# Patient Record
Sex: MALE | Race: OTHER | Employment: UNEMPLOYED | URBAN - METROPOLITAN AREA
[De-identification: names, ages, dates, MRNs, and addresses within clinical notes are randomized per-mention and may not be internally consistent; named-entity substitution may affect disease eponyms.]

---

## 2024-10-29 ENCOUNTER — TELEPHONE (OUTPATIENT)
Dept: OBGYN CLINIC | Facility: MEDICAL CENTER | Age: 34
End: 2024-10-29

## 2024-10-29 NOTE — TELEPHONE ENCOUNTER
Patients brother called to schedule surgery. They did contact price checkers and were given an estimate and was told they were able to go ahead and get the surgery done. Please advise if we can just add them on to surgery schedule or should they follow up?

## 2024-10-31 NOTE — PRE-PROCEDURE INSTRUCTIONS
Pre-Surgery Instructions:   Medication Instructions    acetaminophen (TYLENOL) 500 mg tablet Uses PRN- OK to take day of surgery    ibuprofen (MOTRIN) 600 mg tablet Stop taking this medication at least 3 days prior to surgery/procedure   Medication instructions for day surgery reviewed. Please use only a sip of water to take your instructed medications. Avoid all over the counter vitamins, supplements and NSAIDS for one week prior to surgery per anesthesia guidelines. Tylenol is ok to take as needed.     You will receive a call one business day prior to surgery with an arrival time and hospital directions. If your surgery is scheduled on a Monday, the hospital will be calling you on the Friday prior to your surgery. If you have not heard from anyone by 8pm, please call the hospital supervisor through the hospital  at [phone]. ([place] [phone] or [place] [phone]).    Do not eat or drink anything after midnight the night before your surgery, including candy, mints, lifesavers, or chewing gum. Do not drink alcohol 24hrs before your surgery. Try not to smoke at least 24hrs before your surgery.       Follow the pre surgery showering instructions as listed in the “My Surgical Experience Booklet” or otherwise provided by your surgeon's office. Do not use a blade to shave the surgical area 1 week before surgery. It is okay to use a clean electric clippers up to 24 hours before surgery. Do not apply any lotions, creams, including makeup, cologne, deodorant, or perfumes after showering on the day of your surgery. Do not use dry shampoo, hair spray, hair gel, or any type of hair products.     No contact lenses, eye make-up, or artificial eyelashes. Remove nail polish, including gel polish, and any artificial, gel, or acrylic nails if possible. Remove all jewelry including rings and body piercing jewelry.     Wear causal clothing that is easy to take on and off. Consider your type of surgery.    Keep  any valuables, jewelry, piercings at home. Please bring any specially ordered equipment (sling, braces) if indicated.    Arrange for a responsible person to drive you to and from the hospital on the day of your surgery. Please confirm the visitor policy for the day of your procedure when you receive your phone call with an arrival time.     Call the surgeon's office with any new illnesses, exposures, or additional questions prior to surgery.    Please reference your “My Surgical Experience Booklet” for additional information to prepare for your upcoming surgery.

## 2024-11-01 ENCOUNTER — APPOINTMENT (OUTPATIENT)
Dept: LAB | Facility: CLINIC | Age: 34
End: 2024-11-01
Payer: COMMERCIAL

## 2024-11-01 DIAGNOSIS — S83.511A RUPTURE OF ANTERIOR CRUCIATE LIGAMENT OF RIGHT KNEE, INITIAL ENCOUNTER: ICD-10-CM

## 2024-11-01 DIAGNOSIS — S83.281A TEAR OF LATERAL MENISCUS OF RIGHT KNEE, CURRENT, UNSPECIFIED TEAR TYPE, INITIAL ENCOUNTER: ICD-10-CM

## 2024-11-01 DIAGNOSIS — S83.241A OTHER TEAR OF MEDIAL MENISCUS, CURRENT INJURY, RIGHT KNEE, INITIAL ENCOUNTER: ICD-10-CM

## 2024-11-01 DIAGNOSIS — Z01.818 PRE-OP TESTING: ICD-10-CM

## 2024-11-01 LAB
ANION GAP SERPL CALCULATED.3IONS-SCNC: 9 MMOL/L (ref 4–13)
BUN SERPL-MCNC: 14 MG/DL (ref 5–25)
CALCIUM SERPL-MCNC: 8.9 MG/DL (ref 8.4–10.2)
CHLORIDE SERPL-SCNC: 104 MMOL/L (ref 96–108)
CO2 SERPL-SCNC: 27 MMOL/L (ref 21–32)
CREAT SERPL-MCNC: 0.97 MG/DL (ref 0.6–1.3)
ERYTHROCYTE [DISTWIDTH] IN BLOOD BY AUTOMATED COUNT: 12.8 % (ref 11.6–15.1)
GFR SERPL CREATININE-BSD FRML MDRD: 102 ML/MIN/1.73SQ M
GLUCOSE P FAST SERPL-MCNC: 92 MG/DL (ref 65–99)
HCT VFR BLD AUTO: 48.1 % (ref 36.5–49.3)
HGB BLD-MCNC: 15.6 G/DL (ref 12–17)
MCH RBC QN AUTO: 29.1 PG (ref 26.8–34.3)
MCHC RBC AUTO-ENTMCNC: 32.4 G/DL (ref 31.4–37.4)
MCV RBC AUTO: 90 FL (ref 82–98)
PLATELET # BLD AUTO: 257 THOUSANDS/UL (ref 149–390)
PMV BLD AUTO: 11.5 FL (ref 8.9–12.7)
POTASSIUM SERPL-SCNC: 3.5 MMOL/L (ref 3.5–5.3)
RBC # BLD AUTO: 5.36 MILLION/UL (ref 3.88–5.62)
SODIUM SERPL-SCNC: 140 MMOL/L (ref 135–147)
WBC # BLD AUTO: 5.44 THOUSAND/UL (ref 4.31–10.16)

## 2024-11-01 PROCEDURE — 85027 COMPLETE CBC AUTOMATED: CPT

## 2024-11-01 PROCEDURE — 80048 BASIC METABOLIC PNL TOTAL CA: CPT

## 2024-11-01 PROCEDURE — 36415 COLL VENOUS BLD VENIPUNCTURE: CPT

## 2024-11-04 ENCOUNTER — ANESTHESIA EVENT (OUTPATIENT)
Age: 34
End: 2024-11-04
Payer: COMMERCIAL

## 2024-11-08 ENCOUNTER — DOCUMENTATION (OUTPATIENT)
Dept: OTHER | Facility: HOSPITAL | Age: 34
End: 2024-11-08

## 2024-11-08 ENCOUNTER — ANESTHESIA (OUTPATIENT)
Age: 34
End: 2024-11-08
Payer: COMMERCIAL

## 2024-11-08 ENCOUNTER — HOSPITAL ENCOUNTER (OUTPATIENT)
Age: 34
Setting detail: OUTPATIENT SURGERY
Discharge: HOME | End: 2024-11-08
Attending: ORTHOPAEDIC SURGERY | Admitting: ORTHOPAEDIC SURGERY
Payer: COMMERCIAL

## 2024-11-08 VITALS
DIASTOLIC BLOOD PRESSURE: 62 MMHG | BODY MASS INDEX: 25.94 KG/M2 | OXYGEN SATURATION: 96 % | WEIGHT: 161.4 LBS | TEMPERATURE: 97.5 F | HEIGHT: 66 IN | SYSTOLIC BLOOD PRESSURE: 101 MMHG | HEART RATE: 92 BPM | RESPIRATION RATE: 20 BRPM

## 2024-11-08 DIAGNOSIS — S83.511A RUPTURE OF ANTERIOR CRUCIATE LIGAMENT OF RIGHT KNEE, INITIAL ENCOUNTER: Primary | ICD-10-CM

## 2024-11-08 DIAGNOSIS — Z87.39 S/P ARTHROSCOPIC RECONSTRUCTION OF ACL OF RIGHT KNEE USING QUADRICEPS TENDON AUTOGRAFT: Primary | ICD-10-CM

## 2024-11-08 DIAGNOSIS — Z98.890 S/P ARTHROSCOPIC PARTIAL LATERAL MENISCECTOMY OF RIGHT KNEE: ICD-10-CM

## 2024-11-08 DIAGNOSIS — Z98.890 S/P ARTHROSCOPIC RECONSTRUCTION OF ACL OF RIGHT KNEE USING QUADRICEPS TENDON AUTOGRAFT: ICD-10-CM

## 2024-11-08 DIAGNOSIS — S83.241A OTHER TEAR OF MEDIAL MENISCUS, CURRENT INJURY, RIGHT KNEE, INITIAL ENCOUNTER: ICD-10-CM

## 2024-11-08 DIAGNOSIS — Z98.890 S/P ARTHROSCOPIC PARTIAL MEDIAL MENISCECTOMY OF RIGHT KNEE: ICD-10-CM

## 2024-11-08 DIAGNOSIS — S83.511A RUPTURE OF ANTERIOR CRUCIATE LIGAMENT OF RIGHT KNEE, INITIAL ENCOUNTER: ICD-10-CM

## 2024-11-08 DIAGNOSIS — S83.281A TEAR OF LATERAL MENISCUS OF RIGHT KNEE, CURRENT, UNSPECIFIED TEAR TYPE, INITIAL ENCOUNTER: ICD-10-CM

## 2024-11-08 DIAGNOSIS — Z87.39 S/P ARTHROSCOPIC RECONSTRUCTION OF ACL OF RIGHT KNEE USING QUADRICEPS TENDON AUTOGRAFT: ICD-10-CM

## 2024-11-08 DIAGNOSIS — Z87.828 S/P ARTHROSCOPIC PARTIAL LATERAL MENISCECTOMY OF RIGHT KNEE: ICD-10-CM

## 2024-11-08 DIAGNOSIS — Z98.890 S/P ARTHROSCOPIC RECONSTRUCTION OF ACL OF RIGHT KNEE USING QUADRICEPS TENDON AUTOGRAFT: Primary | ICD-10-CM

## 2024-11-08 DIAGNOSIS — Z87.828 S/P ARTHROSCOPIC PARTIAL MEDIAL MENISCECTOMY OF RIGHT KNEE: ICD-10-CM

## 2024-11-08 PROCEDURE — NC001 PR NO CHARGE: Performed by: ORTHOPAEDIC SURGERY

## 2024-11-08 PROCEDURE — 29888 ARTHRS AID ACL RPR/AGMNTJ: CPT | Performed by: ORTHOPAEDIC SURGERY

## 2024-11-08 PROCEDURE — C9290 INJ, BUPIVACAINE LIPOSOME: HCPCS | Performed by: STUDENT IN AN ORGANIZED HEALTH CARE EDUCATION/TRAINING PROGRAM

## 2024-11-08 PROCEDURE — C1713 ANCHOR/SCREW BN/BN,TIS/BN: HCPCS | Performed by: ORTHOPAEDIC SURGERY

## 2024-11-08 PROCEDURE — 29880 ARTHRS KNE SRG MNISECTMY M&L: CPT | Performed by: ORTHOPAEDIC SURGERY

## 2024-11-08 DEVICE — FIBERTAG TIGHTROPE II
Type: IMPLANTABLE DEVICE | Site: KNEE | Status: FUNCTIONAL
Brand: ARTHREX®

## 2024-11-08 DEVICE — TIGHTROPE ABS BUTTON ROUND 14MM CONCAVE
Type: IMPLANTABLE DEVICE | Site: KNEE | Status: FUNCTIONAL
Brand: ARTHREX®

## 2024-11-08 DEVICE — FIBERTAG TIGHTROPE II ABS
Type: IMPLANTABLE DEVICE | Site: KNEE | Status: FUNCTIONAL
Brand: ARTHREX®

## 2024-11-08 RX ORDER — FENTANYL CITRATE/PF 50 MCG/ML
50 SYRINGE (ML) INJECTION
Status: DISCONTINUED | OUTPATIENT
Start: 2024-11-08 | End: 2024-11-08 | Stop reason: HOSPADM

## 2024-11-08 RX ORDER — DEXAMETHASONE SODIUM PHOSPHATE 10 MG/ML
INJECTION, SOLUTION INTRAMUSCULAR; INTRAVENOUS AS NEEDED
Status: DISCONTINUED | OUTPATIENT
Start: 2024-11-08 | End: 2024-11-08

## 2024-11-08 RX ORDER — SODIUM CHLORIDE, SODIUM LACTATE, POTASSIUM CHLORIDE, CALCIUM CHLORIDE 600; 310; 30; 20 MG/100ML; MG/100ML; MG/100ML; MG/100ML
125 INJECTION, SOLUTION INTRAVENOUS CONTINUOUS
Status: DISCONTINUED | OUTPATIENT
Start: 2024-11-08 | End: 2024-11-08 | Stop reason: HOSPADM

## 2024-11-08 RX ORDER — BUPIVACAINE HYDROCHLORIDE 2.5 MG/ML
INJECTION, SOLUTION EPIDURAL; INFILTRATION; INTRACAUDAL
Status: COMPLETED | OUTPATIENT
Start: 2024-11-08 | End: 2024-11-08

## 2024-11-08 RX ORDER — CEFAZOLIN SODIUM 2 G/50ML
2000 SOLUTION INTRAVENOUS ONCE
Status: COMPLETED | OUTPATIENT
Start: 2024-11-08 | End: 2024-11-08

## 2024-11-08 RX ORDER — ROCURONIUM BROMIDE 10 MG/ML
INJECTION, SOLUTION INTRAVENOUS AS NEEDED
Status: DISCONTINUED | OUTPATIENT
Start: 2024-11-08 | End: 2024-11-08

## 2024-11-08 RX ORDER — ASPIRIN 81 MG/1
81 TABLET ORAL 2 TIMES DAILY
Qty: 84 TABLET | Refills: 0 | Status: SHIPPED | OUTPATIENT
Start: 2024-11-08 | End: 2024-12-20

## 2024-11-08 RX ORDER — ONDANSETRON 2 MG/ML
INJECTION INTRAMUSCULAR; INTRAVENOUS AS NEEDED
Status: DISCONTINUED | OUTPATIENT
Start: 2024-11-08 | End: 2024-11-08

## 2024-11-08 RX ORDER — METOCLOPRAMIDE HYDROCHLORIDE 5 MG/ML
10 INJECTION INTRAMUSCULAR; INTRAVENOUS ONCE AS NEEDED
Status: COMPLETED | OUTPATIENT
Start: 2024-11-08 | End: 2024-11-08

## 2024-11-08 RX ORDER — OXYCODONE HYDROCHLORIDE 5 MG/1
5 TABLET ORAL EVERY 4 HOURS PRN
Qty: 15 TABLET | Refills: 0 | Status: SHIPPED | OUTPATIENT
Start: 2024-11-08 | End: 2024-11-13 | Stop reason: SDUPTHER

## 2024-11-08 RX ORDER — FENTANYL CITRATE 50 UG/ML
INJECTION, SOLUTION INTRAMUSCULAR; INTRAVENOUS
Status: COMPLETED | OUTPATIENT
Start: 2024-11-08 | End: 2024-11-08

## 2024-11-08 RX ORDER — ACETAMINOPHEN 325 MG/1
650 TABLET ORAL EVERY 6 HOURS PRN
Status: DISCONTINUED | OUTPATIENT
Start: 2024-11-08 | End: 2024-11-08 | Stop reason: HOSPADM

## 2024-11-08 RX ORDER — NEOSTIGMINE METHYLSULFATE 1 MG/ML
INJECTION INTRAVENOUS AS NEEDED
Status: DISCONTINUED | OUTPATIENT
Start: 2024-11-08 | End: 2024-11-08

## 2024-11-08 RX ORDER — LIDOCAINE HYDROCHLORIDE 10 MG/ML
INJECTION, SOLUTION EPIDURAL; INFILTRATION; INTRACAUDAL; PERINEURAL AS NEEDED
Status: DISCONTINUED | OUTPATIENT
Start: 2024-11-08 | End: 2024-11-08

## 2024-11-08 RX ORDER — TRAMADOL HYDROCHLORIDE 50 MG/1
50 TABLET ORAL EVERY 6 HOURS PRN
Status: DISCONTINUED | OUTPATIENT
Start: 2024-11-08 | End: 2024-11-08 | Stop reason: HOSPADM

## 2024-11-08 RX ORDER — KETOROLAC TROMETHAMINE 30 MG/ML
INJECTION, SOLUTION INTRAMUSCULAR; INTRAVENOUS AS NEEDED
Status: DISCONTINUED | OUTPATIENT
Start: 2024-11-08 | End: 2024-11-08

## 2024-11-08 RX ORDER — BUPIVACAINE HYDROCHLORIDE 5 MG/ML
INJECTION, SOLUTION EPIDURAL; INTRACAUDAL
Status: DISCONTINUED | OUTPATIENT
Start: 2024-11-08 | End: 2024-11-08

## 2024-11-08 RX ORDER — ACETAMINOPHEN 10 MG/ML
1000 INJECTION, SOLUTION INTRAVENOUS ONCE
Status: COMPLETED | OUTPATIENT
Start: 2024-11-08 | End: 2024-11-08

## 2024-11-08 RX ORDER — GLYCOPYRROLATE 0.2 MG/ML
INJECTION INTRAMUSCULAR; INTRAVENOUS AS NEEDED
Status: DISCONTINUED | OUTPATIENT
Start: 2024-11-08 | End: 2024-11-08

## 2024-11-08 RX ORDER — OXYCODONE HYDROCHLORIDE 5 MG/1
5 TABLET ORAL EVERY 4 HOURS PRN
Status: DISCONTINUED | OUTPATIENT
Start: 2024-11-08 | End: 2024-11-08 | Stop reason: HOSPADM

## 2024-11-08 RX ORDER — HYDROMORPHONE HCL/PF 1 MG/ML
0.5 SYRINGE (ML) INJECTION
Status: DISCONTINUED | OUTPATIENT
Start: 2024-11-08 | End: 2024-11-08 | Stop reason: HOSPADM

## 2024-11-08 RX ORDER — PROPOFOL 10 MG/ML
INJECTION, EMULSION INTRAVENOUS AS NEEDED
Status: DISCONTINUED | OUTPATIENT
Start: 2024-11-08 | End: 2024-11-08

## 2024-11-08 RX ORDER — MIDAZOLAM HYDROCHLORIDE 2 MG/2ML
INJECTION, SOLUTION INTRAMUSCULAR; INTRAVENOUS
Status: COMPLETED | OUTPATIENT
Start: 2024-11-08 | End: 2024-11-08

## 2024-11-08 RX ADMIN — SODIUM CHLORIDE, SODIUM LACTATE, POTASSIUM CHLORIDE, AND CALCIUM CHLORIDE 125 ML/HR: .6; .31; .03; .02 INJECTION, SOLUTION INTRAVENOUS at 16:40

## 2024-11-08 RX ADMIN — ONDANSETRON 4 MG: 2 INJECTION INTRAMUSCULAR; INTRAVENOUS at 12:14

## 2024-11-08 RX ADMIN — DEXAMETHASONE SODIUM PHOSPHATE 10 MG: 10 INJECTION INTRAMUSCULAR; INTRAVENOUS at 12:14

## 2024-11-08 RX ADMIN — FENTANYL CITRATE 50 MCG: 50 INJECTION INTRAMUSCULAR; INTRAVENOUS at 15:09

## 2024-11-08 RX ADMIN — NEOSTIGMINE METHYLSULFATE 3 MG: 1 INJECTION INTRAVENOUS at 14:01

## 2024-11-08 RX ADMIN — BUPIVACAINE HYDROCHLORIDE 15 ML: 2.5 INJECTION, SOLUTION EPIDURAL; INFILTRATION; INTRACAUDAL; PERINEURAL at 11:40

## 2024-11-08 RX ADMIN — FENTANYL CITRATE 100 MCG: 50 INJECTION INTRAMUSCULAR; INTRAVENOUS at 11:35

## 2024-11-08 RX ADMIN — GLYCOPYRROLATE 0.4 MG: 0.2 INJECTION INTRAMUSCULAR; INTRAVENOUS at 14:01

## 2024-11-08 RX ADMIN — HYDROMORPHONE HYDROCHLORIDE 0.5 MG: 1 INJECTION, SOLUTION INTRAMUSCULAR; INTRAVENOUS; SUBCUTANEOUS at 15:33

## 2024-11-08 RX ADMIN — MIDAZOLAM 2 MG: 1 INJECTION INTRAMUSCULAR; INTRAVENOUS at 11:35

## 2024-11-08 RX ADMIN — PROPOFOL 180 MG: 10 INJECTION, EMULSION INTRAVENOUS at 12:10

## 2024-11-08 RX ADMIN — HYDROMORPHONE HYDROCHLORIDE 0.5 MG: 1 INJECTION, SOLUTION INTRAMUSCULAR; INTRAVENOUS; SUBCUTANEOUS at 14:56

## 2024-11-08 RX ADMIN — SODIUM CHLORIDE, SODIUM LACTATE, POTASSIUM CHLORIDE, AND CALCIUM CHLORIDE 125 ML/HR: .6; .31; .03; .02 INJECTION, SOLUTION INTRAVENOUS at 10:34

## 2024-11-08 RX ADMIN — FENTANYL CITRATE 25 MCG: 50 INJECTION INTRAMUSCULAR; INTRAVENOUS at 14:06

## 2024-11-08 RX ADMIN — BUPIVACAINE HYDROCHLORIDE 20 ML: 2.5 INJECTION, SOLUTION EPIDURAL; INFILTRATION; INTRACAUDAL at 11:45

## 2024-11-08 RX ADMIN — KETOROLAC TROMETHAMINE 30 MG: 30 INJECTION, SOLUTION INTRAMUSCULAR at 14:01

## 2024-11-08 RX ADMIN — SODIUM CHLORIDE, SODIUM LACTATE, POTASSIUM CHLORIDE, AND CALCIUM CHLORIDE: .6; .31; .03; .02 INJECTION, SOLUTION INTRAVENOUS at 13:27

## 2024-11-08 RX ADMIN — ACETAMINOPHEN 1000 MG: 10 INJECTION INTRAVENOUS at 15:25

## 2024-11-08 RX ADMIN — FENTANYL CITRATE 25 MCG: 50 INJECTION INTRAMUSCULAR; INTRAVENOUS at 13:17

## 2024-11-08 RX ADMIN — ROCURONIUM BROMIDE 30 MG: 10 INJECTION, SOLUTION INTRAVENOUS at 12:11

## 2024-11-08 RX ADMIN — BUPIVACAINE HYDROCHLORIDE 15 ML: 2.5 INJECTION, SOLUTION EPIDURAL; INFILTRATION; INTRACAUDAL at 11:50

## 2024-11-08 RX ADMIN — MIDAZOLAM 2 MG: 1 INJECTION INTRAMUSCULAR; INTRAVENOUS at 12:03

## 2024-11-08 RX ADMIN — FENTANYL CITRATE 50 MCG: 50 INJECTION INTRAMUSCULAR; INTRAVENOUS at 12:09

## 2024-11-08 RX ADMIN — BUPIVACAINE 20 ML: 13.3 INJECTION, SUSPENSION, LIPOSOMAL INFILTRATION at 11:50

## 2024-11-08 RX ADMIN — LIDOCAINE HYDROCHLORIDE 50 MG: 10 SOLUTION INTRAVENOUS at 12:09

## 2024-11-08 RX ADMIN — HYDROMORPHONE HYDROCHLORIDE 0.5 MG: 1 INJECTION, SOLUTION INTRAMUSCULAR; INTRAVENOUS; SUBCUTANEOUS at 14:47

## 2024-11-08 RX ADMIN — CEFAZOLIN SODIUM 2000 MG: 2 SOLUTION INTRAVENOUS at 12:15

## 2024-11-08 RX ADMIN — METOCLOPRAMIDE 10 MG: 5 INJECTION, SOLUTION INTRAMUSCULAR; INTRAVENOUS at 16:27

## 2024-11-08 NOTE — ANESTHESIA PROCEDURE NOTES
Peripheral Block    Patient location during procedure: holding area  Start time: 11/8/2024 11:45 AM  Reason for block: at surgeon's request and post-op pain management  Staffing  Performed by: Clay Rae MD  Authorized by: Clay Rae MD    Preanesthetic Checklist  Completed: patient identified, IV checked, site marked, risks and benefits discussed, surgical consent, monitors and equipment checked, pre-op evaluation and timeout performed  Peripheral Block  Patient position: left lateral  Prep: ChloraPrep  Patient monitoring: frequent blood pressure checks, continuous pulse oximetry and heart rate  Block type: IPACK  Laterality: right  Injection technique: single-shot  Procedures: ultrasound guided, Ultrasound guidance required for the procedure to increase accuracy and safety of medication placement and decrease risk of complications.  Ultrasound permanent image saved  bupivacaine (PF) (MARCAINE) 0.25 % injection 20 mL - Perineural   15 mL - 11/8/2024 11:50:00 AM  bupivacaine liposomal (EXPAREL) 1.3 % injection 20 mL - Perineural   20 mL - 11/8/2024 11:50:00 AM  Needle  Needle type: Stimuplex   Needle gauge: 22 G  Needle length: 6 in  Needle localization: anatomical landmarks and ultrasound guidance  Needle insertion depth: 12 cm  Assessment  Injection assessment: incremental injection, frequent aspiration, injected with ease, negative aspiration, negative for heart rate change, no paresthesia on injection, no symptoms of intraneural/intravenous injection and needle tip visualized at all times  Paresthesia pain: none  Post-procedure:  site cleaned  patient tolerated the procedure well with no immediate complications

## 2024-11-08 NOTE — ANESTHESIA POSTPROCEDURE EVALUATION
Post-Op Assessment Note    CV Status:  Stable  Pain Score: 0    Pain management: adequate       Mental Status:  Awake and sleepy   Hydration Status:  Euvolemic   PONV Controlled:  Controlled   Airway Patency:  Patent     Post Op Vitals Reviewed: Yes    No anethesia notable event occurred.    Staff: Anesthesiologist, CRNA           Last Filed PACU Vitals:  Vitals Value Taken Time   Temp 97.6    Pulse 78 11/08/24 1424   /58 11/08/24 1421   Resp 31 11/08/24 1424   SpO2 90 % 11/08/24 1424   Vitals shown include unfiled device data.    Modified Jp:  No data recorded

## 2024-11-08 NOTE — DISCHARGE INSTR - AVS FIRST PAGE
POSTOPERATIVE INSTRUCTIONS following KNEE SURGERY    MEDICATIONS:  Resume all home medications unless otherwise instructed by your surgeon.  Pain Medication:  Oxycodone 5 mg, 1 tablet every 4 hours as needed  If you were given a regional anesthetic (nerve block), please begin taking the pain medication as soon as you get home, even if you have minimal or no pain.  DO NOT WAIT FOR THE NERVE BLOCK TO WEAR OFF.  Possible side effects include nausea, constipation, and urinary retention.  If you experience these side effects, please call our office for assistance.  Pain med refills are authorized only during office hours (8am-4pm, Mon-Fri).  Anti-Inflammatory:  Tylenol 325 mg, 1-2 tablets every 6 hours  Take with food.  Stop if you experience nausea, reflux, or stomach pain.  Nausea Medication:  None  Fill prescription ONLY if you expericnce severe nausea.  Blood Clot Prevention:  Aspirin 81 mg, 1 tablet twice daily for 6 weeks  Pump your foot up and down 20 times per hour while you are less mobile.    WOUND CARE:  Keep the dressing clean and dry.  Light drainage may occur the first 2 days postop.  You may remove the dressings and get the incision wet in the shower 72 hours after surgery.  DO NOT remove steri-strips or sutures.  DO NOT immerse the incision under water.  Carefully pat the incision dry.  If there is wound drainage, re-apply a fresh dry gauze dressing.  Please call our office ([phone]) if you experience either of the following:  Sudden increase in swelling, redness, or warmth at the surgical site  Excessive incisional drainage that persists beyond the 3rd day after surgery  Oral temperature greater than 101 degrees, not relieved with Tylenol  Shortness of breath, chest pain, nausea, or any other concerning symptoms    SWELLING CONTROL:  Cold Therapy:  The cold therapy device may be used either continuously or only as needed, according to your preference.  Do not let the pad directly touch your skin.   "Alternatively, apply ice (20 min on, 20 min off) as often as you feel is necessary.  Elevation:  Elevate the entire leg above heart level.  Place pillows under your ankle to keep your knee straight.  Compression:  Apply ACE wraps or a thigh-length compression stocking as needed.    RANGE OF MOTION:  You are allowed FULL RANGE OF MOTION as tolerated.    IMMOBILIZATION:  Your knee brace should be WORN AT ALL TIMES, including sleep.  Keep the brace LOCKED FOR WALKING.  You may unlock the brace while sitting or sleeping.  You may remove the brace only for showering.    ACTIVITY:   BEAR FULL WEIGHT AS TOLERATED on the operative leg.  Use crutches to assist only as needed.  Using Crutches on Stairs:  Going up, lead with your \"good\" (nonoperative) leg.  Going down, lead with your \"bad\" (operative) leg.  Use a hand rail when available.  Knee Extension:  Place a rolled towel or pillow under your ankle for 20-30 minutes 3-5 times per day.  This will help to maintain full knee extension.  Quad Sets:  Sit or lie with your knee straight.  Tighten your quadriceps (front thigh) muscle.  Hold for 3 seconds, then relax.  Repeat 20 times per hour while awake.    PHYSICAL THERAPY:  Begin therapy 3 TO 5 DAYS AFTER SURGERY.  You were given a prescription for therapy at your preoperative office visit.  If you do not have physical therapy scheduled yet, please call our office for assistance.    FOLLOW-UP APPOINTMENT:  7-10 days after surgery with:    Dr. Gigi Chapa DO  [facility]  [address] [place] [address], [address], [place] [address]  [phone]    "

## 2024-11-08 NOTE — ANESTHESIA PROCEDURE NOTES
Peripheral Block    Patient location during procedure: holding area  Start time: 11/8/2024 11:35 AM  Reason for block: at surgeon's request and post-op pain management  Staffing  Performed by: Clay Rae MD  Authorized by: Clay Rae MD    Preanesthetic Checklist  Completed: patient identified, IV checked, site marked, risks and benefits discussed, surgical consent, monitors and equipment checked, pre-op evaluation and timeout performed  Peripheral Block  Patient position: supine  Prep: ChloraPrep  Patient monitoring: frequent blood pressure checks, continuous pulse oximetry and heart rate  Anesthesia block type: genicular nerve blocks except inferolateral.  Laterality: right  Injection technique: single-shot  Procedures: ultrasound guided, Ultrasound guidance required for the procedure to increase accuracy and safety of medication placement and decrease risk of complications.  Ultrasound permanent image saved  bupivacaine (PF) (MARCAINE) 0.25 % injection 20 mL - Perineural   15 mL - 11/8/2024 11:40:00 AM  fentanyl citrate (PF) 100 MCG/2ML 50 mcg - Intravenous   100 mcg - 11/8/2024 11:35:00 AM  midazolam (VERSED) injection 0.5 mg - Intravenous   2 mg - 11/8/2024 11:35:00 AM  bupivacaine liposomal (EXPAREL) 1.3 % injection 20 mL - Perineural   5 mL - 11/8/2024 11:40:00 AM  Needle  Needle type: Stimuplex   Needle gauge: 20 G  Needle length: 2 in  Needle localization: anatomical landmarks and ultrasound guidance  Needle insertion depth: 1 cm  Assessment  Injection assessment: incremental injection, frequent aspiration, injected with ease, negative aspiration, negative for heart rate change, no paresthesia on injection, no symptoms of intraneural/intravenous injection and needle tip visualized at all times  Paresthesia pain: none  Post-procedure:  site cleaned  patient tolerated the procedure well with no immediate complications

## 2024-11-08 NOTE — ANESTHESIA PROCEDURE NOTES
Peripheral Block    Patient location during procedure: holding area  Start time: 11/8/2024 11:40 AM  Reason for block: at surgeon's request and post-op pain management  Staffing  Performed by: Clay Rae MD  Authorized by: Clay Rae MD    Preanesthetic Checklist  Completed: patient identified, IV checked, site marked, risks and benefits discussed, surgical consent, monitors and equipment checked, pre-op evaluation and timeout performed  Peripheral Block  Patient position: supine  Prep: ChloraPrep  Patient monitoring: frequent blood pressure checks, continuous pulse oximetry and heart rate  Block type: Adductor Canal (plus nerve to vastus medialis and anterio femoral cutaneous nerve)  Laterality: right  Injection technique: single-shot  Procedures: ultrasound guided, Ultrasound guidance required for the procedure to increase accuracy and safety of medication placement and decrease risk of complications.  Ultrasound permanent image saved  bupivacaine (PF) (MARCAINE) 0.25 % injection 20 mL - Perineural   20 mL - 11/8/2024 11:45:00 AM  bupivacaine liposomal (EXPAREL) 1.3 % injection 20 mL - Perineural   10 mL - 11/8/2024 11:45:00 AM  Needle  Needle type: Stimuplex   Needle gauge: 20 G  Needle length: 4 in  Needle localization: anatomical landmarks and ultrasound guidance  Needle insertion depth: 5 cm  Assessment  Injection assessment: incremental injection, frequent aspiration, injected with ease, negative aspiration, negative for heart rate change, no paresthesia on injection, no symptoms of intraneural/intravenous injection and needle tip visualized at all times  Paresthesia pain: none  Post-procedure:  site cleaned  patient tolerated the procedure well with no immediate complications

## 2024-11-08 NOTE — H&P
Ortho Sports Medicine Knee New Patient Visit     Assesment:     34 y.o. male right knee medial meniscus tear, lateral meniscus tear, and ACL tear.      Plan:    Conservative treatment:    Ice to knee for 20 minutes at least 1-2 times daily.  OTC NSAIDS prn for pain.  The patient has tried and failed the following conservative measures: physical therapy and bracing.    Imaging:    All imaging from today was reviewed by myself and explained to the patient.       Injection:    No Injection planned at this time.      Surgery:     All of the risks and benefits of operative treatment were explained to the patient, as well as the risks and benefits of any alternative treatment options, including nonoperative care. The risks of surgical treatement include, but are not limited to, infection, bleeding, blood clot, neurovascular damage, need for further surgery, continued pain, cardiovascular risk, and anesthesia risk.  The patient understood this and elects to proceed forward with surgical intervention.  We will proceed forward with surgical arthroscopy of the knee with ACL reconstruction with quadriceps tendon autograft and meniscus repair vs menisectomy of menisci.  We discussed possible allograft as a backup    Follow up:    No follow-ups on file.    No chief complaint on file.    History of Present Illness:    The patient is a 34 y.o. male referred to me by Dr. Liang, seen in clinic for consultation of right knee pain.      The patient sustained an injury in late June while running during a soccer game. He initially heard a cracking noise and experienced swelling at the time of injury. He reports worsening pain and instability with ambulation. His swelling has since resolved.     Pain is dull and located lateral and medial. It improves with rest and medication but is aggravated by ambulation.     The patient has tried NSAIDS, physical therapy, and bracing. Of note, the patient does not have insurance.    History was  obtained with .     Knee Surgical History:  None    Past Medical, Social and Family History:  History reviewed. No pertinent past medical history.  Past Surgical History:   Procedure Laterality Date    CHOLECYSTECTOMY       No Known Allergies  No current facility-administered medications on file prior to encounter.     Current Outpatient Medications on File Prior to Encounter   Medication Sig Dispense Refill    acetaminophen (TYLENOL) 500 mg tablet Take 1 tablet (500 mg total) by mouth every 6 (six) hours as needed for moderate pain or mild pain 30 tablet 0    ibuprofen (MOTRIN) 600 mg tablet Take 1 tablet (600 mg total) by mouth every 6 (six) hours as needed for mild pain or moderate pain 30 tablet 0     Social History     Socioeconomic History    Marital status: Single     Spouse name: Not on file    Number of children: Not on file    Years of education: Not on file    Highest education level: Not on file   Occupational History    Not on file   Tobacco Use    Smoking status: Never    Smokeless tobacco: Never   Vaping Use    Vaping status: Never Used   Substance and Sexual Activity    Alcohol use: Never    Drug use: Never    Sexual activity: Not on file   Other Topics Concern    Not on file   Social History Narrative    Not on file     Social Determinants of Health     Financial Resource Strain: Not on file   Food Insecurity: Not on file   Transportation Needs: Not on file   Physical Activity: Not on file   Stress: Not on file   Social Connections: Not on file   Intimate Partner Violence: Not on file   Housing Stability: Not on file         I have reviewed the past medical, surgical, social and family history, medications and allergies as documented in the EMR.    Review of systems: ROS is negative other than that noted in the HPI.  Constitutional: Negative for fatigue and fever.   HENT: Negative for sore throat.    Respiratory: Negative for shortness of breath.    Cardiovascular: Negative for  chest pain.   Gastrointestinal: Negative for abdominal pain.   Endocrine: Negative for cold intolerance and heat intolerance.   Genitourinary: Negative for flank pain.   Musculoskeletal: Negative for back pain.   Skin: Negative for rash.   Allergic/Immunologic: Negative for immunocompromised state.   Neurological: Negative for dizziness.   Psychiatric/Behavioral: Negative for agitation.      Physical Exam:    There were no vitals taken for this visit.    General/Constitutional: NAD, well developed, well nourished  HENT: Normocephalic, atraumatic  CV: Intact distal pulses, regular rate  Resp: No respiratory distress or labored breathing  GI: Soft and non-tender   Lymphatic: No lymphadenopathy palpated  Neuro: Alert and Oriented x 3, no focal deficits  Psych: Normal mood, normal affect, normal judgement, normal behavior  Skin: Warm, dry, no rashes, no erythema      Knee Exam (focused):                RIGHT LEFT   ROM:   0-130 0-130   Palpation: Effusion negative negative     MJL tenderness Positive Negative     LJL tenderness Positive Negative   Meniscus: [place] Not Applicable Not Applicable    Apley's Compression Not Applicable Not Applicable   Instability: Varus stable stable     Valgus stable stable   Special Tests: Lachman Positive Negative     Posterior drawer Negative Negative     Anterior drawer Positive Negative     Pivot shift not tested not tested     Dial not tested not tested   Patella: Palpation no tenderness no tenderness     Mobility 1/4 1/4     Apprehension Not Applicable Not Applicable   Other: Single leg 1/4 squat not tested not tested      LE NV Exam: +2 DP/PT pulses bilaterally  Sensation intact to light touch L2-S1 bilaterally     Bilateral hip ROM demonstrates no pain actively or passively    No calf tenderness to palpation bilaterally    Knee Imaging    MRI of the right knee were reviewed, which demonstrate an ACL tear, with medial and lateral meniscus tears.  I have reviewed the radiology  report and agree with their impression.

## 2024-11-08 NOTE — ANESTHESIA PREPROCEDURE EVALUATION
Procedure:  ARTHROSCOPIC RECONSTRUCTION ANTERIOR CRUCIATE LIGAMENT (ACL) WITH AUTOGRAFT- quadiceps autografts (Right: Knee)  MENISCECTOMY LATERAL /MEDIAL (Right: Knee)    Relevant Problems   No relevant active problems        Physical Exam    Airway    Mallampati score: I  TM Distance: >3 FB  Neck ROM: full     Dental   No notable dental hx     Cardiovascular      Pulmonary      Other Findings        Anesthesia Plan  ASA Score- 1     Anesthesia Type- general with ASA Monitors.         Additional Monitors:     Airway Plan: ETT.           Plan Factors-Exercise tolerance (METS): >4 METS.    Chart reviewed.    Patient summary reviewed.    Patient is not a current smoker.      There is medical exclusion for perioperative obstructive sleep apnea risk education.        Induction- intravenous.    Postoperative Plan- Plan for postoperative opioid use.         Informed Consent- Anesthetic plan and risks discussed with patient.  I personally reviewed this patient with the CRNA. Discussed and agreed on the Anesthesia Plan with the CRNA..

## 2024-11-11 NOTE — ANESTHESIA POSTPROCEDURE EVALUATION
Post-Op Assessment Note    CV Status:  Stable    Pain management: adequate       Mental Status:  Alert and awake   Hydration Status:  Euvolemic   PONV Controlled:  Controlled   Airway Patency:  Patent     Post Op Vitals Reviewed: Yes    No anethesia notable event occurred.    Staff: Anesthesiologist, with CRNAs           Last Filed PACU Vitals:  Vitals Value Taken Time   Temp     Pulse 77 11/08/24 1548   BP 88/61 11/08/24 1545   Resp 20 11/08/24 1548   SpO2 95 % 11/08/24 1548   Vitals shown include unfiled device data.    Modified Jp:  No data recorded

## 2024-11-11 NOTE — OP NOTE
OPERATIVE REPORT  PATIENT NAME: Yasir GlezuZeferino    :  1990  MRN: [id number]  Pt Location: [facility] OR ROOM 05    SURGERY DATE: 2024    Surgeons and Role:     * Gigi Chapa DO - Primary     * Elizabeth Segura PA-C - Assisting     * Phillip Samuels MD - Assisting    Preop Diagnosis:  Rupture of anterior cruciate ligament of right knee, initial encounter [S83.511A]  Other tear of medial meniscus, current injury, right knee, initial encounter [S83.241A]  Tear of lateral meniscus of right knee, current, unspecified tear type, initial encounter [S83.281A]    Post-Op Diagnosis Codes:     * Rupture of anterior cruciate ligament of right knee, initial encounter [S83.511A]     * Other tear of medial meniscus, current injury, right knee, initial encounter [S83.241A]     * Tear of lateral meniscus of right knee, current, unspecified tear type, initial encounter [S83.281A]    Procedure(s):  Right - ARTHROSCOPIC RECONSTRUCTION ANTERIOR CRUCIATE LIGAMENT (ACL) WITH AUTOGRAFT- quadiceps autografts  Right - MENISCECTOMY LATERAL /MEDIAL    Specimen(s):  * No specimens in log *    Estimated Blood Loss:   Minimal    Drains:  * No LDAs found *    Anesthesia Type:   General    Operative Indications:  Rupture of anterior cruciate ligament of right knee, initial encounter [S83.511A]  Other tear of medial meniscus, current injury, right knee, initial encounter [S83.241A]  Tear of lateral meniscus of right knee, current, unspecified tear type, initial encounter [S83.281A]    Complications:   None    Procedure and Technique:      This is a patient with right complete acl tear. Due to the patient's MRI findings and active lifestyle, it was recommended that they proceed forward with ACL reconstruction. We reviewed risks and benefits of surgery and a decision was made to proceed with surgery to address their torn ACL      Findings:    Examination under anesthesia of the operative knee revealed a range of motion of  0-130 degrees. Posterior drawer testing was negative. Lachman testing was positive. Pivot shift testing was positive,  Collateral ligament stability testing revealed no laxity with valgus or varus stresses. With respect to posterolateral corner testing, dial testing at 30 and at 90 degrees was symmetric to the contralateral knee.     Arthroscopic evaluation of the right knee revealed the following:   Medial meniscus: Degenerative change of bucket-handle meniscus tear with significant medial compartment osteoarthritis  Medial femoral condyle:Grade 3 chondral defects.  Medial tibial plateau: Grade 0 chondral defects.   Anterior cruciate ligament: Complete tear of the anterior cruciate ligament..   Posterior cruciate ligament: Normal appearance.   Lateral meniscus:   Small tear of the intermargin of the medial meniscus  Lateral femoral condyle: Grade I small chondral defects.   Lateral tibial plateau: Grade 0 chondral defects.    Medial and lateral gutters: No loose bodies.   Patella: Grade 0 chondral defects.   Trochlea: Grade 0 chondral defects.   Medial plica: No significant plica was present..      Procedure:  In the pre-operative holding area, the patient identified the correct operative extremity and I marked that extremity with my initials, using a permanent marker. The patient was then brought to the operating room and positioned supine. Following satisfactory induction of anesthesia, right   knee was prepped and draped in the usual sterile fashion for surgical arthroscopy right knee. Before any surgical instrumentation was passed to me by the surgical technician, a formalized time-out occurred, which involves the surgeon, circulating nurse, and anesthesia staff all verifying the correct operative extremity. My initials were visible on the prepped and draped operative field.      A 3 in incision was made vertically in line with the superior border of patella and quadriceps tendon.  The skin was incised and  hemostasis was obtained with electrocautery.  The subcutaneous fat was dissected until the bursa was encountered and removed from the anterior quadriceps tendon.  At this point an 9 mm quadriceps graft was harvested.  A knife was used to the find the distal aspect of the quadriceps tendon with a full-thickness quadriceps tendon graft.  The Quadpro tendon harvester was used after whip stitching the end of the quad tendon graft to harvest the remaining proximal quad tendon to a depth of 7 cm this was taken to the back table.  The graft was prepped for an all-inside graft length technique using SutureTape per Arthrex technique.  The quad tendon was then sutured together side to side from the harvest site.  The deep layer of fat was closed with a running 0 Vicryl in the subcutaneous layer was closed with 2-0 Vicryl and the skin was closed with a running Monocryl stitch   The graft was placed under 15 lbs of tension and placed in a saline soaked sponge for later use during the surgery.     The anatomic landmarks of the anteromedial and anterolateral portals were marked. The anterolateral portal was established with a scalpel. The arthroscope was introduced through this portal. Under direct visualization, the anteromedial portal was established with a localizing needle followed by a scalpel. A probe was then introduced into the anteromedial portal. A systematic diagnostic arthroscopy evaluated the following:  medial compartment, notch, lateral compartment, patellofemoral compartment, medial gutter, and lateral gutter.           The ACL was completely ruptured. The ACL reconstruction was performed by debriding the native ACL to the posterior aspect of the lateral femoral condyle.      The medial meniscus was degenerative and multidirectional a torn in a bucket-handle component.  For that reason it was not felt that there is a repair possible.  A medial meniscectomy was performed with meniscal biters and shaving  device.    The lateral meniscus had an intermargin tearing with fraying and this was debrided with a motorized shaving device until a stable rim of meniscus remained.  This completed both the lateral and medial meniscectomy.     The patient had had the femoral guide placed in the anatomic footprint of the ACL at the intercondylar notch, with the guide set on 105°.  The drill guide was advanced down to the skin, and an incision was made in line with the guide with a 15 blade. A hemostat was used to spread the subcutaneous skin and muscle to clear a path for the guide. The guide was then advanced down to bone. A 9.5 mm flip cutter was advanced through the guide until it entered the joint at the anatomic ACL footprint.  The femoral drill guide was disassembled and removed from the joint, leaving the drill guide and drill in place. The metal drill guide was malleted into the femur to its depth stop of 7 mm.  The flip cutter was placed into the 90 degree cutting position by sliding the button on the drill of the flip cutter.  The rubber grommet was advanced to the base of the drill guide. The FlipCutter was retrodrilled 30 mm in length on the femoral side.  A fiberstick suture was placed and retrieved out the medial portal. The suture was held with a hemostatic into place.      A tibial guide was placed in the anatomic footprint at 55 degrees.  The drill guide was advanced down to the skin, and an incision was made in line with the guide with a 15 blade. A hemostat was used to spread the subcutaneous skin and muscle to clear a path for the guide. The guide was then advanced down to bone.  The 9.5 mm flip cutter was advanced through the guide until it entered the joint at the anatomic ACL footprint.  The tibial drill guide was disassembled and removed from the joint, leaving the flipcutter drill guide and drill in place. The metal drill guide was malleted into the tiba to its depth stop of 7 mm.  The flip cutter was  placed into the 90 degree cutting position by sliding the button on the drill of the flip cutter.  The rubber grommet was advanced to the base of the drill guide. The FlipCutter was retrodrilled 30 mm in length on the tibial side.  A fiberstick suture was placed and retrieved out the medial portal. The suture was held with a hemostat into place.     The autograft acl was then brought to the field.  The femoral passing suture loop was release form the hemostat, and the femoral sided sutures from the autograft were shuttled out the femoral tunnel.  The sutures were then advanced until the button passed through the outer cortex of the femur, which was visualized with the arthroscope.  The button was flipped beyond the distal lateral femur.  The graft was cinched appropriately into position.  The graft was then held in full extension under tension, and the TightRope ABS button was cinched on the proximal tibia.       The graft was noted to be appropriately taut on each end, and fine tuning with the cinching technique for the button proximally and distally revealed appropriate tension.  The graft was appropriately taut.  There was no impingement in flexion and extension, and there was no pathologic laxity to ligamentous stress testing.  Arthroscopic photos were taken throughout the case.        There was no additional pathology. All particulate debris was removed. The knee was copiously rinsed and then drained.  The anteromedial incision over the tibial tunnel as well as the quadriceps tendon incision was closed with buried #0 Vicryl suture.  The 2-0 Vicryl absorbable suturewas used to close the deep dermal layer.  A running subcuticular layer of 4-0 Monocryl was utilized for subcuticular closure.         The medial and lateral portals were closer with 4-0 monocryl.  The skin was cleansed with sterile saline and then dried before Steri-Strips were applied to all wounds. Finally, a sterile dressing was secured by Webril,  an Ace wrap, and hinged knee brace.          I was present for the entire procedure.    Patient Disposition:  PACU              SIGNATURE: Gigi Chapa DO  DATE: November 11, 2024  TIME: 11:10 AM

## 2024-11-12 RX ORDER — ONDANSETRON 4 MG/1
4 TABLET, FILM COATED ORAL EVERY 8 HOURS PRN
Qty: 20 TABLET | Refills: 0 | Status: SHIPPED | OUTPATIENT
Start: 2024-11-12

## 2024-11-13 ENCOUNTER — OFFICE VISIT (OUTPATIENT)
Dept: OBGYN CLINIC | Facility: MEDICAL CENTER | Age: 34
End: 2024-11-13

## 2024-11-13 VITALS
DIASTOLIC BLOOD PRESSURE: 73 MMHG | BODY MASS INDEX: 26.05 KG/M2 | HEIGHT: 66 IN | HEART RATE: 83 BPM | SYSTOLIC BLOOD PRESSURE: 114 MMHG

## 2024-11-13 DIAGNOSIS — Z87.828 S/P ARTHROSCOPIC PARTIAL MEDIAL MENISCECTOMY OF RIGHT KNEE: ICD-10-CM

## 2024-11-13 DIAGNOSIS — Z98.890 S/P ARTHROSCOPIC PARTIAL LATERAL MENISCECTOMY OF RIGHT KNEE: ICD-10-CM

## 2024-11-13 DIAGNOSIS — Z87.828 S/P ARTHROSCOPIC PARTIAL LATERAL MENISCECTOMY OF RIGHT KNEE: ICD-10-CM

## 2024-11-13 DIAGNOSIS — Z87.39 S/P ARTHROSCOPIC RECONSTRUCTION OF ACL OF RIGHT KNEE USING QUADRICEPS TENDON AUTOGRAFT: ICD-10-CM

## 2024-11-13 DIAGNOSIS — S83.511A RUPTURE OF ANTERIOR CRUCIATE LIGAMENT OF RIGHT KNEE, INITIAL ENCOUNTER: ICD-10-CM

## 2024-11-13 DIAGNOSIS — S83.281A TEAR OF LATERAL MENISCUS OF RIGHT KNEE, CURRENT, UNSPECIFIED TEAR TYPE, INITIAL ENCOUNTER: ICD-10-CM

## 2024-11-13 DIAGNOSIS — Z98.890 S/P ARTHROSCOPIC PARTIAL MEDIAL MENISCECTOMY OF RIGHT KNEE: ICD-10-CM

## 2024-11-13 DIAGNOSIS — S83.241A OTHER TEAR OF MEDIAL MENISCUS, CURRENT INJURY, RIGHT KNEE, INITIAL ENCOUNTER: ICD-10-CM

## 2024-11-13 DIAGNOSIS — Z98.890 S/P ARTHROSCOPIC RECONSTRUCTION OF ACL OF RIGHT KNEE USING QUADRICEPS TENDON AUTOGRAFT: ICD-10-CM

## 2024-11-13 PROCEDURE — 99024 POSTOP FOLLOW-UP VISIT: CPT | Performed by: PHYSICIAN ASSISTANT

## 2024-11-13 RX ORDER — OXYCODONE HYDROCHLORIDE 5 MG/1
5 TABLET ORAL EVERY 4 HOURS PRN
Qty: 15 TABLET | Refills: 0 | Status: SHIPPED | OUTPATIENT
Start: 2024-11-13 | End: 2024-11-19 | Stop reason: SDUPTHER

## 2024-11-13 NOTE — PROGRESS NOTES
Knee Post Operative Visit     Assesment:     34 y.o. male 1 week s/p surgical arthroscopy of the right knee with ACL reconstruction with quadriceps autograft with partial medial and lateral menisectomies, DOS: 11/8/24    Plan:    Post-Operative treatment:    Ice to knee for 20 minutes at least 1-2 times daily.  OTC NSAIDS prn for pain.  PT per protocol-discussed at length with the patient that we would like him to start physical therapy either the end of this week or early next week.  Instructed that he should start to work on his range of motion leading up to therapy.  Demonstrated exercises that he should be performing.  Demonstrated prone leg hangs. Instructed that they should start to incorporate them in their home PT exercises to work on obtaining terminal extension to prevent any future knee limitations. Instructed to perform daily against gravity or can hang a draw string bag from the ankle with weigh to really stretch the posterior capsule to obtain full extension.  Steri-strips were changed in the office today. No incisional concerns. New steri-strips applied. Instructed that they may shower, allowing the warm soapy water to run over the incision and pat dry. If steri-strips fall off on their own, that is fine, if they are still in place in 1 week they are to remove the steri-strips. No soaking, baths, or tubs at this time. No creams ointments or lotions for an additional 3 weeks. Start scar massage in 3-4 weeks.    Imaging:    All imaging from today was reviewed by myself and explained to the patient.     Weight bearing:  as tolerated     ROM:  Full    Brace:  Wear brace at all times other than for showers, Keep brace locked in extension for ambulation, and May unlock brace for ROM exercises    DVT Prophylaxis:  Aspirin 81 mg oral twice daily x 6 weeks post-op and Ambulation    Follow up:   4 weeks, motion check        Patient was advised that if they have any fevers, chills, chest pain, shortness of  "breath, redness or drainage from the incision, please let our office know immediately.          Chief Complaint   Patient presents with    Right Knee - Post-op       History of Present Illness:    The patient is a 34 y.o. male who is being evaluated post operatively 1 week s/p surgical arthroscopy of the right knee with ACL reconstruction with quadriceps autograft with partial medial and lateral menisectomies, DOS: 11/8/24.    Since the prior visit, He reports  improvement.  He states that his pain is well-controlled at this time.  Patient is placing weight down on the leg without difficulty.  Patient states he is not placing full weight but ambulating with the use of crutches well.  Patient has not started physical therapy yet or performed any knee range of motion.    Pain is well controlled.  The patient is using ice to control swelling.    They have not started physical therapy.     The patient Aspirin 81 mg oral twice daily x 6 weeks and Ambulation for DVT ppx.  The patient has been ambulating with crutches.    The patient has been ambulating with a brace.    The patient denies any fevers, chills, calf pain, chest pain/shortness of breath, redness or drainage from the incision.         I have reviewed the past medical, surgical, social and family history, medications and allergies as documented in the EMR.    Review of systems: ROS is negative other than that noted in the HPI.  Constitutional: Negative for fatigue and fever.        Physical Exam:    Blood pressure 114/73, pulse 83, height 5' 6\" (1.676 m).    General/Constitutional: NAD, well developed, well nourished  HENT: Normocephalic, atraumatic  CV: Intact distal pulses, regular rate  Resp: No respiratory distress or labored breathing  Lymphatic: No lymphadenopathy palpated  Neuro: Alert and Oriented x 3, no focal deficits  Psych: Normal mood, normal affect, normal judgement, normal behavior  Skin: Warm, dry, no rashes, no erythema      Knee Exam " (focused):                  RIGHT LEFT   ROM:   5-60 0-130   Palpation: Effusion mild negative     MJL tenderness Negative Negative     LJL tenderness Negative Negative   Instability: Varus stable stable     Valgus stable stable   Special Tests: Lachman Negative Negative     Posterior drawer Negative Negative     Anterior drawer Negative Negative     Pivot shift not tested not tested     Dial not tested not tested   Patella: Palpation no tenderness no tenderness     Mobility 1/4 1/4     Apprehension Negative Negative   Other: Single leg 1/4 squat not tested not tested      Incisions show no erythema, no drainage    LE NV Exam: +2 DP/PT pulses bilaterally  Sensation intact to light touch L2-S1 bilaterally     Bilateral hip ROM demonstrates no pain actively or passively    No calf tenderness to palpation bilaterally

## 2024-11-18 ENCOUNTER — EVALUATION (OUTPATIENT)
Dept: PHYSICAL THERAPY | Facility: REHABILITATION | Age: 34
End: 2024-11-18

## 2024-11-18 ENCOUNTER — TELEPHONE (OUTPATIENT)
Dept: OTHER | Facility: HOSPITAL | Age: 34
End: 2024-11-18

## 2024-11-18 DIAGNOSIS — Z87.828 S/P ARTHROSCOPIC PARTIAL LATERAL MENISCECTOMY OF RIGHT KNEE: ICD-10-CM

## 2024-11-18 DIAGNOSIS — Z87.39 S/P ARTHROSCOPIC RECONSTRUCTION OF ACL OF RIGHT KNEE USING QUADRICEPS TENDON AUTOGRAFT: Primary | ICD-10-CM

## 2024-11-18 DIAGNOSIS — S83.281A TEAR OF LATERAL MENISCUS OF RIGHT KNEE, CURRENT, UNSPECIFIED TEAR TYPE, INITIAL ENCOUNTER: ICD-10-CM

## 2024-11-18 DIAGNOSIS — Z87.828 S/P ARTHROSCOPIC PARTIAL MEDIAL MENISCECTOMY OF RIGHT KNEE: ICD-10-CM

## 2024-11-18 DIAGNOSIS — Z98.890 S/P ARTHROSCOPIC PARTIAL LATERAL MENISCECTOMY OF RIGHT KNEE: ICD-10-CM

## 2024-11-18 DIAGNOSIS — Z98.890 S/P ARTHROSCOPIC PARTIAL MEDIAL MENISCECTOMY OF RIGHT KNEE: ICD-10-CM

## 2024-11-18 DIAGNOSIS — Z98.890 S/P ARTHROSCOPIC RECONSTRUCTION OF ACL OF RIGHT KNEE USING QUADRICEPS TENDON AUTOGRAFT: Primary | ICD-10-CM

## 2024-11-18 DIAGNOSIS — S83.511A RUPTURE OF ANTERIOR CRUCIATE LIGAMENT OF RIGHT KNEE, INITIAL ENCOUNTER: ICD-10-CM

## 2024-11-18 DIAGNOSIS — S83.241A OTHER TEAR OF MEDIAL MENISCUS, CURRENT INJURY, RIGHT KNEE, INITIAL ENCOUNTER: ICD-10-CM

## 2024-11-18 PROCEDURE — 97161 PT EVAL LOW COMPLEX 20 MIN: CPT

## 2024-11-18 PROCEDURE — 97110 THERAPEUTIC EXERCISES: CPT

## 2024-11-18 NOTE — PROGRESS NOTES
PT Evaluation     Today's date: 2024  Patient name: Yasir Avalos  : 1990  MRN: [id number]  Referring provider: Elizabeth Segura,*  Dx:   Encounter Diagnosis     ICD-10-CM    1. S/P arthroscopic reconstruction of ACL of right knee using quadriceps tendon autograft  Z98.890 Ambulatory Referral to Physical Therapy    Z87.39       2. S/P arthroscopic partial medial meniscectomy of right knee  Z98.890 Ambulatory Referral to Physical Therapy    Z87.828       3. Rupture of anterior cruciate ligament of right knee, initial encounter  S83.511A Ambulatory Referral to Physical Therapy      4. Tear of lateral meniscus of right knee, current, unspecified tear type, initial encounter  S83.281A Ambulatory Referral to Physical Therapy      5. Other tear of medial meniscus, current injury, right knee, initial encounter  S83.241A Ambulatory Referral to Physical Therapy      6. S/P arthroscopic partial lateral meniscectomy of right knee  Z98.890 Ambulatory Referral to Physical Therapy    Z87.828           Start Time: 0515  Stop Time: 0545  Total time in clinic (min): 30 minutes    Assessment  Impairments: abnormal gait, abnormal muscle tone, abnormal or restricted ROM, activity intolerance, impaired balance, impaired physical strength, lacks appropriate home exercise program, pain with function, weight-bearing intolerance, poor posture  and poor body mechanics  Functional limitations: prolonged standing/walking, squating, lifting, stair navigation  Symptom irritability: moderate    Assessment details: Patient is a 34 y.o. male presenting to initial examination with chief complaint of R knee pain and stiffness 2-weeks S/P arthroscopic ACL reconstruction w/ quad autograft + medial/lateral meniscectomy. Signs and symptoms are consistent with referred diagnosis. Primary impairments include painful/restricted knee ROM, swelling, WB intolerance, gross weakness of the R knee, and poor biomechanics with  functional tranfers. As a result of impairments patient experiences limitations with functional/daily activities including those listed above. Educated patient regarding plan of care and answered all patient questions to patient satisfaction. Patient would benefit from skilled PT interventions to address above impairments, achieve goals, and to maximize function. Thank you for the referral.    Understanding of Dx/Px/POC: good     Prognosis: good    Goals  Impairment Goals: 4-6 weeks  - Patient to decrease pain to 1-2/10  - Patient to improve knee AROM to equal to uninvolved side  - Patient to increase knee strength to 5/5 throughout  - Patient to increase hip strength to 5/5 throughout    Functional Goals: by discharge  - Patient to discharge to independent HEP  - Patient to improve subjective functional level to 85%  - Patient to ambulate in home and community without increased pain or difficulty   - Patient to ascend and descend stairs without increased pain or difficulty  - Patient to complete sit to stand transfers without increased pain or difficulty      Plan  Patient would benefit from: skilled physical therapy  Referral necessary: No    Planned therapy interventions: manual therapy, joint mobilization, nerve gliding, neuromuscular re-education, patient/caregiver education, postural training, strengthening, stretching, therapeutic activities, therapeutic exercise, home exercise program, gait training, functional ROM exercises, behavior modification, body mechanics training, balance, abdominal trunk stabilization and activity modification    Frequency: 2x week  Duration in weeks: 6  Plan of Care beginning date: 11/18/2024  Plan of Care expiration date: 1/3/2025  Treatment plan discussed with: patient      Subjective Evaluation    History of Present Illness  Date of surgery: 11/8/2024  Mechanism of injury: surgery  Mechanism of injury: Patient reports to PT with chief complaint of R Knee pain and stiffness 2  week s/p arthroscopic ACL reconstruction with quadriceps autograft + partial medial and lateral meniscectomy, DOS: 24    He describes the following regarding his current symptoms and subsequent restrictions: (presents with his family who acted as interpretation throughout session)     - Reports original injury was sustained on  while playing soccer where he planted his leg and states he felt a pop as he twisted and pivoted. Would get pain and clicking/locking in the knee afterward  - MRI post revealed torn ACL + Medial/Lateral Meniscus, was referred for surgical intervention  - Reports no complications with surgery and states that pain has been well managed  - Isnt taking any pain medication at this point, is taking asprin for prophylaxis, reports no fevers/chills or abnormal redness/swelling/heat/discharge from surgical site  - Had ortho follow-up on  and states that things looked good.  Was instructed to focus on knee mobility at this stage as he gets set up with PT  - Has been doing okay with WB, is able to put some weight through the leg but has been ambulating with B/L crutches without issue     Pain Location:   - surrounding knee but mainly over the top of the knee, describes as aching with occasional sharpness  - reports no numbness or tingling  - reports no calf pain    Previous/Current Treatment:  - Ice and elevation     Pain Exacerbated by:  - Prolonged standing and walking  - squatting  - stair navigation  - heavier lifting as for work            Not a recurrent problem   Quality of life: good    Patient Goals  Patient goals for therapy: increased strength, independence with ADLs/IADLs, return to sport/leisure activities, increased motion, improved balance, decreased pain and decreased edema    Pain  Current pain ratin  At best pain rating: 3  At worst pain ratin  Quality: tight, sharp and dull ache  Relieving factors: ice  Aggravating factors: lifting, stair climbing, walking,  standing and sitting  Progression: no change    Treatments  No previous or current treatments      Objective     Observations     Right Knee   Positive for edema and incision.     Additional Observation Details  Incision appears to be well maintained, no obvious signs of infection, normal post surgical swelling/heat/redness        Palpation     Right   Hypertonic in the distal biceps femoris, distal semimembranosus, distal semitendinosus, lateral gastrocnemius and medial gastrocnemius.   Tenderness of the distal biceps femoris, distal semimembranosus, distal semitendinosus, rectus femoris, vastus lateralis and vastus medialis.     Tenderness     Right Knee   Tenderness in the lateral joint line, lateral patella, medial joint line, medial patella, patellar tendon, popliteal fossa and quadriceps tendon.     Active Range of Motion   Left Knee   Flexion: 135 degrees WFL  Extension: 2 degrees WFL    Right Knee   Flexion: 30 degrees with pain  Extension: -10 degrees with pain    Strength/Myotome Testing     Left Knee   Flexion: 5  Extension: 5  Quadriceps contraction: good    Right Knee   Quadriceps contraction: fair    Swelling     Left Knee Girth Measurement (cm)   Joint line: 36 cm    Right Knee Girth Measurement (cm)   Joint line: 42 cm    Functional Assessment      Squat    Unable to perform .              Precautions: S/P ACL Reconstruction 2-weeks      Manuals 11/18            Knee PROM                                                    Neuro Re-Ed             Quad Sets HEP            Quad Set + SLR             SL Hip ABD             Prone Hip EXT             Weight Shifts Fwd/Bck + Lat                                       Ther Ex             Heel Slides PROM + Strap HEP            Prone Hang Stretch HEP            Seated HS Stretch HEP                                                                             Ther Activity                                       Gait Training                                        Modalities

## 2024-11-18 NOTE — TELEPHONE ENCOUNTER
Patient friend called . He stated Yasir is in a lot of pain and have swelling. He is requesting pain medication, specifically   oxyCODONE (ROXICODONE) 5 immediate release tablet . Patient friend asked if someone can call him to let him know if medication will be sent in.

## 2024-11-19 DIAGNOSIS — Z87.39 S/P ARTHROSCOPIC RECONSTRUCTION OF ACL OF RIGHT KNEE USING QUADRICEPS TENDON AUTOGRAFT: ICD-10-CM

## 2024-11-19 DIAGNOSIS — S83.511A RUPTURE OF ANTERIOR CRUCIATE LIGAMENT OF RIGHT KNEE, INITIAL ENCOUNTER: ICD-10-CM

## 2024-11-19 DIAGNOSIS — S83.281A TEAR OF LATERAL MENISCUS OF RIGHT KNEE, CURRENT, UNSPECIFIED TEAR TYPE, INITIAL ENCOUNTER: ICD-10-CM

## 2024-11-19 DIAGNOSIS — Z87.828 S/P ARTHROSCOPIC PARTIAL MEDIAL MENISCECTOMY OF RIGHT KNEE: ICD-10-CM

## 2024-11-19 DIAGNOSIS — Z87.828 S/P ARTHROSCOPIC PARTIAL LATERAL MENISCECTOMY OF RIGHT KNEE: ICD-10-CM

## 2024-11-19 DIAGNOSIS — Z98.890 S/P ARTHROSCOPIC PARTIAL LATERAL MENISCECTOMY OF RIGHT KNEE: ICD-10-CM

## 2024-11-19 DIAGNOSIS — S83.241A OTHER TEAR OF MEDIAL MENISCUS, CURRENT INJURY, RIGHT KNEE, INITIAL ENCOUNTER: ICD-10-CM

## 2024-11-19 DIAGNOSIS — Z98.890 S/P ARTHROSCOPIC PARTIAL MEDIAL MENISCECTOMY OF RIGHT KNEE: ICD-10-CM

## 2024-11-19 DIAGNOSIS — Z98.890 S/P ARTHROSCOPIC RECONSTRUCTION OF ACL OF RIGHT KNEE USING QUADRICEPS TENDON AUTOGRAFT: ICD-10-CM

## 2024-11-19 RX ORDER — OXYCODONE HYDROCHLORIDE 5 MG/1
5 TABLET ORAL EVERY 4 HOURS PRN
Qty: 15 TABLET | Refills: 0 | Status: SHIPPED | OUTPATIENT
Start: 2024-11-19

## 2024-11-19 NOTE — TELEPHONE ENCOUNTER
Please call the patient and instruct that we have placed a refill on the oxycodone, but instruct that this will be his last refill. We recommend that he continue to utilize ibuprofen, Tylenol, and ice for day to day pain control and save the oxycodone for the days he has therapy.

## 2024-11-19 NOTE — TELEPHONE ENCOUNTER
Called and spoke with pt through  [phone]. Pt states he has some swelling still in his leg and the pain comes and goes. Relayed KING Segura's message he stated understanding. Also per last office visit note pt was supposed to start PT. He states he is scheduled for the beginning of next week.

## 2024-11-26 ENCOUNTER — OFFICE VISIT (OUTPATIENT)
Dept: PHYSICAL THERAPY | Facility: REHABILITATION | Age: 34
End: 2024-11-26

## 2024-11-26 DIAGNOSIS — Z98.890 S/P ARTHROSCOPIC RECONSTRUCTION OF ACL OF RIGHT KNEE USING QUADRICEPS TENDON AUTOGRAFT: Primary | ICD-10-CM

## 2024-11-26 DIAGNOSIS — S83.281A TEAR OF LATERAL MENISCUS OF RIGHT KNEE, CURRENT, UNSPECIFIED TEAR TYPE, INITIAL ENCOUNTER: ICD-10-CM

## 2024-11-26 DIAGNOSIS — Z87.39 S/P ARTHROSCOPIC RECONSTRUCTION OF ACL OF RIGHT KNEE USING QUADRICEPS TENDON AUTOGRAFT: Primary | ICD-10-CM

## 2024-11-26 DIAGNOSIS — Z87.828 S/P ARTHROSCOPIC PARTIAL MEDIAL MENISCECTOMY OF RIGHT KNEE: ICD-10-CM

## 2024-11-26 DIAGNOSIS — S83.511A RUPTURE OF ANTERIOR CRUCIATE LIGAMENT OF RIGHT KNEE, INITIAL ENCOUNTER: ICD-10-CM

## 2024-11-26 DIAGNOSIS — S83.241A OTHER TEAR OF MEDIAL MENISCUS, CURRENT INJURY, RIGHT KNEE, INITIAL ENCOUNTER: ICD-10-CM

## 2024-11-26 DIAGNOSIS — Z98.890 S/P ARTHROSCOPIC PARTIAL LATERAL MENISCECTOMY OF RIGHT KNEE: ICD-10-CM

## 2024-11-26 DIAGNOSIS — Z98.890 S/P ARTHROSCOPIC PARTIAL MEDIAL MENISCECTOMY OF RIGHT KNEE: ICD-10-CM

## 2024-11-26 DIAGNOSIS — Z87.828 S/P ARTHROSCOPIC PARTIAL LATERAL MENISCECTOMY OF RIGHT KNEE: ICD-10-CM

## 2024-11-26 PROCEDURE — 97112 NEUROMUSCULAR REEDUCATION: CPT

## 2024-11-26 PROCEDURE — 97110 THERAPEUTIC EXERCISES: CPT

## 2024-11-26 NOTE — PROGRESS NOTES
Daily Note     Today's date: 2024  Patient name: Yasir Avalos  : 1990  MRN: [id number]  Referring provider: Elizabeth Segura,*  Dx:   Encounter Diagnosis     ICD-10-CM    1. S/P arthroscopic reconstruction of ACL of right knee using quadriceps tendon autograft  Z98.890     Z87.39       2. S/P arthroscopic partial medial meniscectomy of right knee  Z98.890     Z87.828       3. S/P arthroscopic partial lateral meniscectomy of right knee  Z98.890     Z87.828       4. Rupture of anterior cruciate ligament of right knee, initial encounter  S83.511A       5. Tear of lateral meniscus of right knee, current, unspecified tear type, initial encounter  S83.281A       6. Other tear of medial meniscus, current injury, right knee, initial encounter  S83.241A           Start Time: 0730  Stop Time: 08  Total time in clinic (min): 35 minutes    Subjective: ( [id number] utilized throughout session) Patient notes that pain is improved but still gets some with his exercises but feels this is a normal degree of pain.  States that the exercises dont give him trouble.      Objective: See treatment diary below      Assessment: Tolerated treatment well. Patient demonstrated fatigue post treatment, exhibited good technique with therapeutic exercises, and would benefit from continued PT.  Session focused on reviewing HEP interventions and protocol at this stage to ensure proper adherence/symptom response.  Continued difficulties present engaging quad musculature with quad sets but improved with tactile cueing with towel behind the knee and tapping of the quads. Quad lag persists with SLR but improved ability to perform SLR compared to IE.  Knee flexion at 80 degrees following heel slides today.  Calf stretch and SLR provided to HEP and patient educated regarding proper dosage. Session shortened due to financial concerns regarding self pay and lack of financial assistance. Instructed patient to  "continue with HEP as this is sorted out and to reach out with any questions or concerns in the interim period if this will influence sessions going into the future.    Plan: Continue per plan of care.      Precautions: S/P ACL Reconstruction 3-weeks      Manuals 11/18 11/26           Knee PROM                                                    Neuro Re-Ed             Quad Sets HEP 5\"x10           Quad Set + SLR  3x5           SL Hip ABD             Prone Hip EXT             Weight Shifts Fwd/Bck + Lat                                       Ther Ex             Heel Slides PROM + Strap HEP 5\"x10           Prone Hang Stretch HEP            Seated HS Stretch HEP            Calf Stretch Supine  3x30\"                                                               Ther Activity                                       Gait Training                                       Modalities                                            "

## 2024-11-29 ENCOUNTER — OFFICE VISIT (OUTPATIENT)
Dept: PHYSICAL THERAPY | Facility: REHABILITATION | Age: 34
End: 2024-11-29

## 2024-11-29 DIAGNOSIS — Z98.890 S/P ARTHROSCOPIC PARTIAL MEDIAL MENISCECTOMY OF RIGHT KNEE: ICD-10-CM

## 2024-11-29 DIAGNOSIS — S83.511A RUPTURE OF ANTERIOR CRUCIATE LIGAMENT OF RIGHT KNEE, INITIAL ENCOUNTER: ICD-10-CM

## 2024-11-29 DIAGNOSIS — Z98.890 S/P ARTHROSCOPIC PARTIAL LATERAL MENISCECTOMY OF RIGHT KNEE: ICD-10-CM

## 2024-11-29 DIAGNOSIS — S83.281A TEAR OF LATERAL MENISCUS OF RIGHT KNEE, CURRENT, UNSPECIFIED TEAR TYPE, INITIAL ENCOUNTER: ICD-10-CM

## 2024-11-29 DIAGNOSIS — Z87.39 S/P ARTHROSCOPIC RECONSTRUCTION OF ACL OF RIGHT KNEE USING QUADRICEPS TENDON AUTOGRAFT: Primary | ICD-10-CM

## 2024-11-29 DIAGNOSIS — S83.241A OTHER TEAR OF MEDIAL MENISCUS, CURRENT INJURY, RIGHT KNEE, INITIAL ENCOUNTER: ICD-10-CM

## 2024-11-29 DIAGNOSIS — Z87.828 S/P ARTHROSCOPIC PARTIAL MEDIAL MENISCECTOMY OF RIGHT KNEE: ICD-10-CM

## 2024-11-29 DIAGNOSIS — Z98.890 S/P ARTHROSCOPIC RECONSTRUCTION OF ACL OF RIGHT KNEE USING QUADRICEPS TENDON AUTOGRAFT: Primary | ICD-10-CM

## 2024-11-29 DIAGNOSIS — Z87.828 S/P ARTHROSCOPIC PARTIAL LATERAL MENISCECTOMY OF RIGHT KNEE: ICD-10-CM

## 2024-11-29 PROCEDURE — 97112 NEUROMUSCULAR REEDUCATION: CPT

## 2024-11-29 PROCEDURE — 97110 THERAPEUTIC EXERCISES: CPT

## 2024-11-29 NOTE — PROGRESS NOTES
Daily Note     Today's date: 2024  Patient name: Yasir Avalos  : 1990  MRN: [id number]  Referring provider: Elizabeth Segura,*  Dx:   Encounter Diagnosis     ICD-10-CM    1. S/P arthroscopic reconstruction of ACL of right knee using quadriceps tendon autograft  Z98.890     Z87.39       2. S/P arthroscopic partial medial meniscectomy of right knee  Z98.890     Z87.828       3. S/P arthroscopic partial lateral meniscectomy of right knee  Z98.890     Z87.828       4. Rupture of anterior cruciate ligament of right knee, initial encounter  S83.511A       5. Tear of lateral meniscus of right knee, current, unspecified tear type, initial encounter  S83.281A       6. Other tear of medial meniscus, current injury, right knee, initial encounter  S83.241A           Start Time: 0730  Stop Time: 08  Total time in clinic (min): 35 minutes    Subjective: ( [id number] utilized throughout session) Patient states no issues with HEP interventions provided last session.  Notes that he gets slight pain in the knee with the leg raise as it is difficult.  Mentions his brace has loosened up a bit from last session and is unsure how to adjust.      Objective: See treatment diary below      Assessment: Tolerated treatment well. Patient demonstrated fatigue post treatment, exhibited good technique with therapeutic exercises, and would benefit from continued PT.  Session focused on initiating hip strengthening with addition of SL/prone leg raises where brace was kept on for additional stability.  Provided leg raises to HEP and instructed patient on proper form/dosage/symptom response.  Encouraged patient to continue allocating time focusing on improving knee ROM as restriction continues to persist both into flexion/ext. Quad lag continues with SLR but slightly improved from last session but patient struggles with some pain in the knee with this exercise.  This was improved with addition of strap  "assist and instructed patient to utilize strap assist at home to help mediate pain during exercise but still appropriately challenge quad strength.  Time spent adjusting brace and showing patient how to tighten velcro in the event that his brace becomes loose again. Educated patient regarding importance of brace to provide stability to the knee at this stage in recovery.  Plan to progress as symptoms allow and assess symptom response at time of NV.    Plan: Continue per plan of care.      Precautions: S/P ACL Reconstruction 3-weeks      Manuals 11/18 11/26 11/29          Knee PROM                                                    Neuro Re-Ed             Quad Sets HEP 5\"x10 5\"x10          Quad Set + SLR  3x5 3x5 (+strap assist)          SL Hip ABD   2x10          Prone Hip EXT   2x10          Weight Shifts Fwd/Bck + Lat                          Patient EDU/Brace Management   MT          Ther Ex             Heel Slides PROM + Strap HEP 5\"x10 5\"x10          Prone Hang Stretch HEP            Seated HS Stretch HEP            Calf Stretch Supine  3x30\" 3x30\"                                                              Ther Activity                                       Gait Training                                       Modalities                                            "

## 2024-12-02 ENCOUNTER — OFFICE VISIT (OUTPATIENT)
Dept: PHYSICAL THERAPY | Facility: REHABILITATION | Age: 34
End: 2024-12-02

## 2024-12-02 DIAGNOSIS — Z87.828 S/P ARTHROSCOPIC PARTIAL LATERAL MENISCECTOMY OF RIGHT KNEE: ICD-10-CM

## 2024-12-02 DIAGNOSIS — Z98.890 S/P ARTHROSCOPIC PARTIAL MEDIAL MENISCECTOMY OF RIGHT KNEE: ICD-10-CM

## 2024-12-02 DIAGNOSIS — Z87.39 S/P ARTHROSCOPIC RECONSTRUCTION OF ACL OF RIGHT KNEE USING QUADRICEPS TENDON AUTOGRAFT: Primary | ICD-10-CM

## 2024-12-02 DIAGNOSIS — Z87.828 S/P ARTHROSCOPIC PARTIAL MEDIAL MENISCECTOMY OF RIGHT KNEE: ICD-10-CM

## 2024-12-02 DIAGNOSIS — Z98.890 S/P ARTHROSCOPIC RECONSTRUCTION OF ACL OF RIGHT KNEE USING QUADRICEPS TENDON AUTOGRAFT: Primary | ICD-10-CM

## 2024-12-02 DIAGNOSIS — Z98.890 S/P ARTHROSCOPIC PARTIAL LATERAL MENISCECTOMY OF RIGHT KNEE: ICD-10-CM

## 2024-12-02 DIAGNOSIS — S83.511A RUPTURE OF ANTERIOR CRUCIATE LIGAMENT OF RIGHT KNEE, INITIAL ENCOUNTER: ICD-10-CM

## 2024-12-02 DIAGNOSIS — S83.281A TEAR OF LATERAL MENISCUS OF RIGHT KNEE, CURRENT, UNSPECIFIED TEAR TYPE, INITIAL ENCOUNTER: ICD-10-CM

## 2024-12-02 DIAGNOSIS — S83.241A OTHER TEAR OF MEDIAL MENISCUS, CURRENT INJURY, RIGHT KNEE, INITIAL ENCOUNTER: ICD-10-CM

## 2024-12-02 PROCEDURE — 97112 NEUROMUSCULAR REEDUCATION: CPT

## 2024-12-02 PROCEDURE — 97110 THERAPEUTIC EXERCISES: CPT

## 2024-12-02 NOTE — PROGRESS NOTES
Daily Note     Today's date: 2024  Patient name: Yasir Avalos  : 1990  MRN: [id number]  Referring provider: Elizabeth Segura,*  Dx:   Encounter Diagnosis     ICD-10-CM    1. S/P arthroscopic reconstruction of ACL of right knee using quadriceps tendon autograft  Z98.890     Z87.39       2. S/P arthroscopic partial medial meniscectomy of right knee  Z98.890     Z87.828       3. S/P arthroscopic partial lateral meniscectomy of right knee  Z98.890     Z87.828       4. Rupture of anterior cruciate ligament of right knee, initial encounter  S83.511A       5. Tear of lateral meniscus of right knee, current, unspecified tear type, initial encounter  S83.281A       6. Other tear of medial meniscus, current injury, right knee, initial encounter  S83.241A           Start Time: 0545  Stop Time: 0620  Total time in clinic (min): 35 minutes    Subjective: ( [id number] utilized throughout session) Patient states no issues performing hip exercises at home added last session.  States knee bending is improving with his stretching at home.      Objective: See treatment diary below      Assessment: Tolerated treatment well. Patient demonstrated fatigue post treatment, exhibited good technique with therapeutic exercises, and would benefit from continued PT.  Knee flexion improving with passive stretching, now able to achieve just shy of 90 degrees but still with pain.  Session focused on emphasizing knee ROM as patient continues to present with limitations both into flexion/EXT.  Educated patient regarding post-operative protocol and educated patient and reviewed importance of adhering to brace when in WB and to avoid open chain knee EXT until protocol allows.  Reviewed knee EXT based stretching and instructed patient to perform numerous times through the day as well as prior to quad strengthening as to capitalize in improvements in knee EXT ROM.  Tolerated session well with minimal discomfort.   "Improving quad lag with SLR but still persists.  Plan to continue to progress as symptoms allow.    Plan: Continue per plan of care.      Precautions: S/P ACL Reconstruction 4-weeks      Manuals 11/18 11/26 11/29 12/2         Knee PROM                                                    Neuro Re-Ed             Quad Sets HEP 5\"x10 5\"x10 5\"x10         Quad Set + SLR  3x5 3x5 (+strap assist) 3x5 No Assist         SL Hip ABD   2x10          Prone Hip EXT   2x10          Weight Shifts Fwd/Bck + Lat    5\"x10ea         SL Hip ADD             Patient EDU/Brace Management   MT          Ther Ex             Heel Slides PROM + Strap HEP 5\"x10 5\"x10 5\"x10         Prone Hang Stretch HEP            Seated HS Stretch HEP            Calf Stretch Supine  3x30\" 3x30\"          Heel Prop LLLD Stretch    5min (#2)                                                Ther Activity                                       Gait Training                                       Modalities                                            "

## 2024-12-05 ENCOUNTER — OFFICE VISIT (OUTPATIENT)
Dept: PHYSICAL THERAPY | Facility: REHABILITATION | Age: 34
End: 2024-12-05

## 2024-12-05 DIAGNOSIS — Z98.890 S/P ARTHROSCOPIC PARTIAL MEDIAL MENISCECTOMY OF RIGHT KNEE: ICD-10-CM

## 2024-12-05 DIAGNOSIS — S83.241A OTHER TEAR OF MEDIAL MENISCUS, CURRENT INJURY, RIGHT KNEE, INITIAL ENCOUNTER: ICD-10-CM

## 2024-12-05 DIAGNOSIS — Z87.828 S/P ARTHROSCOPIC PARTIAL MEDIAL MENISCECTOMY OF RIGHT KNEE: ICD-10-CM

## 2024-12-05 DIAGNOSIS — Z98.890 S/P ARTHROSCOPIC RECONSTRUCTION OF ACL OF RIGHT KNEE USING QUADRICEPS TENDON AUTOGRAFT: Primary | ICD-10-CM

## 2024-12-05 DIAGNOSIS — S83.511A RUPTURE OF ANTERIOR CRUCIATE LIGAMENT OF RIGHT KNEE, INITIAL ENCOUNTER: ICD-10-CM

## 2024-12-05 DIAGNOSIS — S83.281A TEAR OF LATERAL MENISCUS OF RIGHT KNEE, CURRENT, UNSPECIFIED TEAR TYPE, INITIAL ENCOUNTER: ICD-10-CM

## 2024-12-05 DIAGNOSIS — Z87.828 S/P ARTHROSCOPIC PARTIAL LATERAL MENISCECTOMY OF RIGHT KNEE: ICD-10-CM

## 2024-12-05 DIAGNOSIS — Z98.890 S/P ARTHROSCOPIC PARTIAL LATERAL MENISCECTOMY OF RIGHT KNEE: ICD-10-CM

## 2024-12-05 DIAGNOSIS — Z87.39 S/P ARTHROSCOPIC RECONSTRUCTION OF ACL OF RIGHT KNEE USING QUADRICEPS TENDON AUTOGRAFT: Primary | ICD-10-CM

## 2024-12-05 PROCEDURE — 97112 NEUROMUSCULAR REEDUCATION: CPT

## 2024-12-05 PROCEDURE — 97110 THERAPEUTIC EXERCISES: CPT

## 2024-12-05 NOTE — PROGRESS NOTES
Daily Note     Today's date: 2024  Patient name: Yasir Avalos  : 1990  MRN: [id number]  Referring provider: Elizabeth Segura,*  Dx:   Encounter Diagnosis     ICD-10-CM    1. S/P arthroscopic reconstruction of ACL of right knee using quadriceps tendon autograft  Z98.890     Z87.39       2. S/P arthroscopic partial medial meniscectomy of right knee  Z98.890     Z87.828       3. S/P arthroscopic partial lateral meniscectomy of right knee  Z98.890     Z87.828       4. Rupture of anterior cruciate ligament of right knee, initial encounter  S83.511A       5. Tear of lateral meniscus of right knee, current, unspecified tear type, initial encounter  S83.281A       6. Other tear of medial meniscus, current injury, right knee, initial encounter  S83.241A           Start Time: 0545  Stop Time: 0620  Total time in clinic (min): 35 minutes    Subjective: ( [id number] utilized throughout session) Patient states exercises are being performed at home consistently without issue.  States that pain is minimal and gets some with his exercises but this subsides as soon as he is done. Notes he isnt doing the quad sets much.      Objective: See treatment diary below      Assessment: Tolerated treatment well. Patient demonstrated fatigue post treatment, exhibited good technique with therapeutic exercises, and would benefit from continued PT.  Time spent today discussing the importance of adherence to quad based strengthening such as quad sets as well as the TKEs added at todays visit in order to appropriately activate/strengthen quad and better stabilize the knee.  Discussed the implications of quad strength in making progressions within protocol as it will be required prior to making a transition away from brace.  Currently quad lag is still present but slowly improving. Instructed patient in ambulation with single crutch with step through patterning and this was performed well with good stability.  "Encouraged patient to begin increasing general WB and practice ambulation with only one crutch, and explained that it is safe to fully WB on the affected leg at this time so long as his brace is firmly on. Plan to review quad based strengthening and knee EXT ROM with patient at next visit and plan to begin gait training with brace locked and no AD. (Of note: sessions continue to be shortened to accommodate for self pay as patient still has not provided the necessary documentation needed by financial aid services at this time, and encouraged patient to reach out after today's session to determine next steps to finish processing his case.  Explained this will inevitably allow us a greater amount of time with full sessions to focus on necessary progressions within program to maximize functional return)    Plan: Continue per plan of care.      Precautions: S/P ACL Reconstruction 4-weeks      Manuals 11/18 11/26 11/29 12/2 12/5        Knee PROM                                                    Neuro Re-Ed     PATIENT EDU        Quad Sets HEP 5\"x10 5\"x10 5\"x10 10\"x20\"        Quad Set + SLR  3x5 3x5 (+strap assist) 3x5 No Assist 3x5 No Assist        TKE Standing EOB     5\"x20        SL Hip ABD   2x10          Prone Hip EXT   2x10          Weight Shifts Fwd/Bck + Lat    5\"x10ea         SL Hip ADD             Patient EDU/Brace Management   MT          Clinic Walking w/ Single Crutch     4 Laps        Ther Ex             Heel Slides PROM + Strap HEP 5\"x10 5\"x10 5\"x10         Prone Hang Stretch HEP            Seated HS Stretch HEP            Calf Stretch Supine  3x30\" 3x30\"          Heel Prop LLLD Stretch    5min (#2)                                                Ther Activity                                       Gait Training                                       Modalities                                            "

## 2024-12-09 ENCOUNTER — OFFICE VISIT (OUTPATIENT)
Dept: PHYSICAL THERAPY | Facility: REHABILITATION | Age: 34
End: 2024-12-09
Payer: COMMERCIAL

## 2024-12-09 DIAGNOSIS — S83.241A OTHER TEAR OF MEDIAL MENISCUS, CURRENT INJURY, RIGHT KNEE, INITIAL ENCOUNTER: ICD-10-CM

## 2024-12-09 DIAGNOSIS — S83.511A RUPTURE OF ANTERIOR CRUCIATE LIGAMENT OF RIGHT KNEE, INITIAL ENCOUNTER: ICD-10-CM

## 2024-12-09 DIAGNOSIS — Z87.828 S/P ARTHROSCOPIC PARTIAL MEDIAL MENISCECTOMY OF RIGHT KNEE: ICD-10-CM

## 2024-12-09 DIAGNOSIS — S83.281A TEAR OF LATERAL MENISCUS OF RIGHT KNEE, CURRENT, UNSPECIFIED TEAR TYPE, INITIAL ENCOUNTER: ICD-10-CM

## 2024-12-09 DIAGNOSIS — Z87.39 S/P ARTHROSCOPIC RECONSTRUCTION OF ACL OF RIGHT KNEE USING QUADRICEPS TENDON AUTOGRAFT: Primary | ICD-10-CM

## 2024-12-09 DIAGNOSIS — Z98.890 S/P ARTHROSCOPIC PARTIAL MEDIAL MENISCECTOMY OF RIGHT KNEE: ICD-10-CM

## 2024-12-09 DIAGNOSIS — Z98.890 S/P ARTHROSCOPIC PARTIAL LATERAL MENISCECTOMY OF RIGHT KNEE: ICD-10-CM

## 2024-12-09 DIAGNOSIS — Z87.828 S/P ARTHROSCOPIC PARTIAL LATERAL MENISCECTOMY OF RIGHT KNEE: ICD-10-CM

## 2024-12-09 DIAGNOSIS — Z98.890 S/P ARTHROSCOPIC RECONSTRUCTION OF ACL OF RIGHT KNEE USING QUADRICEPS TENDON AUTOGRAFT: Primary | ICD-10-CM

## 2024-12-09 PROCEDURE — 97110 THERAPEUTIC EXERCISES: CPT

## 2024-12-09 PROCEDURE — 97112 NEUROMUSCULAR REEDUCATION: CPT

## 2024-12-09 NOTE — PROGRESS NOTES
Daily Note     Today's date: 2024  Patient name: Yasir Avalos  : 1990  MRN: [id number]  Referring provider: Elizabeth Segura,*  Dx:   Encounter Diagnosis     ICD-10-CM    1. S/P arthroscopic reconstruction of ACL of right knee using quadriceps tendon autograft  Z98.890     Z87.39       2. S/P arthroscopic partial medial meniscectomy of right knee  Z98.890     Z87.828       3. S/P arthroscopic partial lateral meniscectomy of right knee  Z98.890     Z87.828       4. Rupture of anterior cruciate ligament of right knee, initial encounter  S83.511A       5. Tear of lateral meniscus of right knee, current, unspecified tear type, initial encounter  S83.281A       6. Other tear of medial meniscus, current injury, right knee, initial encounter  S83.241A           Start Time: 0545  Stop Time: 0620  Total time in clinic (min): 35 minutes    Subjective: ( [id number] utilized throughout session) Patient states exercises following last session are going well and notes he has been doing his exercises more frequently now without any complication.      Objective: See treatment diary below      Assessment: Tolerated treatment well. Patient demonstrated fatigue post treatment, exhibited good technique with therapeutic exercises, and would benefit from continued PT. Reviewed quad sets to ensure proper form which looked improved today compard to last session.  Performed today with foam under ankle to improve assistance from gravity for knee EXT.  Progressed TKE with addition of band resistance with no increase in pain.  Time spent working more on gait without crutches where patient didn't have difficulty with pain but continues to demonstrate asymmetry with reduced stance time and step length in the R LE.  This was improved following cueing.  Instructed patient to initiated walking in home without AD and in the community if pain isnt increased and he feels confident and to transition to single  "crutch if he still feels instable.  Instructed patient to continue working on HEP with increased frequency as quad appeared to be firing slightly better today and knee ROM both into flexion and EXT appears improved.  Plan to re-assess at time of NV    Plan: Continue per plan of care.      Precautions: S/P ACL Reconstruction 4-weeks      Manuals 11/18 11/26 11/29 12/2 12/5 12/9       Knee PROM                                                    Neuro Re-Ed     PATIENT EDU        Quad Sets HEP 5\"x10 5\"x10 5\"x10 10\"x20\" 10\"x20\" (foam under ankle)       Quad Set + SLR  3x5 3x5 (+strap assist) 3x5 No Assist 3x5 No Assist        TKE Standing EOB     5\"x20 5\"x20 (YTB)       SL Hip ABD   2x10          Prone Hip EXT   2x10          Weight Shifts Fwd/Bck + Lat    5\"x10ea         SL Hip ADD             Patient EDU/Brace Management   MT          Clinic Walking w/ Single Crutch     4 Laps 4 Laps (no crutches)       Ther Ex             Heel Slides PROM + Strap HEP 5\"x10 5\"x10 5\"x10         Prone Hang Stretch HEP            Seated HS Stretch HEP            Calf Stretch Supine  3x30\" 3x30\"          Heel Prop LLLD Stretch    5min (#2)                                                Ther Activity                                       Gait Training                                       Modalities                                            "

## 2024-12-10 ENCOUNTER — OFFICE VISIT (OUTPATIENT)
Dept: OBGYN CLINIC | Facility: MEDICAL CENTER | Age: 34
End: 2024-12-10

## 2024-12-10 VITALS
HEART RATE: 73 BPM | HEIGHT: 66 IN | SYSTOLIC BLOOD PRESSURE: 109 MMHG | BODY MASS INDEX: 26.05 KG/M2 | DIASTOLIC BLOOD PRESSURE: 73 MMHG

## 2024-12-10 DIAGNOSIS — S83.511A RUPTURE OF ANTERIOR CRUCIATE LIGAMENT OF RIGHT KNEE, INITIAL ENCOUNTER: ICD-10-CM

## 2024-12-10 DIAGNOSIS — Z98.890 S/P ARTHROSCOPIC PARTIAL MEDIAL MENISCECTOMY OF RIGHT KNEE: ICD-10-CM

## 2024-12-10 DIAGNOSIS — S83.241A OTHER TEAR OF MEDIAL MENISCUS, CURRENT INJURY, RIGHT KNEE, INITIAL ENCOUNTER: ICD-10-CM

## 2024-12-10 DIAGNOSIS — Z98.890 S/P ARTHROSCOPIC PARTIAL LATERAL MENISCECTOMY OF RIGHT KNEE: ICD-10-CM

## 2024-12-10 DIAGNOSIS — Z87.39 S/P ARTHROSCOPIC RECONSTRUCTION OF ACL OF RIGHT KNEE USING QUADRICEPS TENDON AUTOGRAFT: Primary | ICD-10-CM

## 2024-12-10 DIAGNOSIS — S83.281A TEAR OF LATERAL MENISCUS OF RIGHT KNEE, CURRENT, UNSPECIFIED TEAR TYPE, INITIAL ENCOUNTER: ICD-10-CM

## 2024-12-10 DIAGNOSIS — Z98.890 S/P ARTHROSCOPIC RECONSTRUCTION OF ACL OF RIGHT KNEE USING QUADRICEPS TENDON AUTOGRAFT: Primary | ICD-10-CM

## 2024-12-10 DIAGNOSIS — Z87.828 S/P ARTHROSCOPIC PARTIAL MEDIAL MENISCECTOMY OF RIGHT KNEE: ICD-10-CM

## 2024-12-10 DIAGNOSIS — Z87.828 S/P ARTHROSCOPIC PARTIAL LATERAL MENISCECTOMY OF RIGHT KNEE: ICD-10-CM

## 2024-12-10 PROCEDURE — 99024 POSTOP FOLLOW-UP VISIT: CPT | Performed by: ORTHOPAEDIC SURGERY

## 2024-12-10 NOTE — PROGRESS NOTES
Knee Post Operative Visit     Assesment:     34 y.o. male 4 weeks s/p surgical arthroscopy of the right knee with ACL reconstruction with quadriceps autograft with partial medial and lateral menisectomies, DOS: 11/08/2024.    Plan:    Post-Operative treatment:    Continue PT per protocol. Encouraged patient to continue to work on his knee ROM in flexion.  Script provided.    Imaging:    All imaging from today was reviewed by myself and explained to the patient.     Weight bearing:  as tolerated     ROM:  Full    Brace:  Wear brace at all times other than for showers, Keep brace locked in extension for ambulation, and May unlock brace for ROM exercises    DVT Prophylaxis:  Aspirin 81 mg oral twice daily x 6 weeks post-op and Ambulation    Follow up:   4 weeks, motion check        Patient was advised that if they have any fevers, chills, chest pain, shortness of breath, redness or drainage from the incision, please let our office know immediately.          Chief Complaint   Patient presents with    Right Knee - Follow-up       History of Present Illness:    The patient is a 34 y.o. male who is being evaluated post operatively 4 weeks s/p surgical arthroscopy of the right knee with ACL reconstruction with quadriceps autograft with partial medial and lateral menisectomies, DOS: 11/08/2024. Patient presents today with a friend who provided Citizen of [place] translation throughout today's visit.    Since the prior visit, He reports  improvement.  He states that his pain is well-controlled at this time.  He denies use of medication for pain. He is using ice to control swelling    They have not started physical therapy. He is attending PT 2x/week.     The patient Aspirin 81 mg oral twice daily x 6 weeks and Ambulation for DVT ppx.  The patient has been ambulating with one crutch.    The patient has been ambulating with a TROM.    The patient denies any fevers, chills, calf pain, chest pain/shortness of breath, redness or drainage  "from the incision.         I have reviewed the past medical, surgical, social and family history, medications and allergies as documented in the EMR.    Review of systems: ROS is negative other than that noted in the HPI.  Constitutional: Negative for fatigue and fever.        Physical Exam:    Blood pressure 109/73, pulse 73, height 5' 6\" (1.676 m).    General/Constitutional: NAD, well developed, well nourished  HENT: Normocephalic, atraumatic  CV: Intact distal pulses, regular rate  Resp: No respiratory distress or labored breathing  Lymphatic: No lymphadenopathy palpated  Neuro: Alert and Oriented x 3, no focal deficits  Psych: Normal mood, normal affect, normal judgement, normal behavior  Skin: Warm, dry, no rashes, no erythema      Knee Exam (focused):                  RIGHT LEFT   ROM:   0-80 0-130   Palpation: Effusion mild negative     MJL tenderness Positive Negative     LJL tenderness Negative Negative   Instability: Varus stable stable     Valgus stable stable   Special Tests: Lachman Negative Negative     Posterior drawer Negative Negative     Anterior drawer Negative Negative     Pivot shift not tested not tested     Dial not tested not tested   Patella: Palpation no tenderness no tenderness     Mobility 1/4 1/4     Apprehension Negative Negative   Other: Single leg 1/4 squat not tested    Expected quad atrophy not tested      Incisions show no erythema, no drainage    LE NV Exam: +2 DP/PT pulses bilaterally  Sensation intact to light touch L2-S1 bilaterally     Bilateral hip ROM demonstrates no pain actively or passively    No calf tenderness to palpation bilaterally      Scribe Attestation      I,:  Brittany Anne am acting as a scribe while in the presence of the attending physician.:       I,:  Gigi Chapa, DO personally performed the services described in this documentation    as scribed in my presence.:             "

## 2024-12-12 ENCOUNTER — OFFICE VISIT (OUTPATIENT)
Dept: PHYSICAL THERAPY | Facility: REHABILITATION | Age: 34
End: 2024-12-12
Payer: COMMERCIAL

## 2024-12-12 DIAGNOSIS — S83.281A TEAR OF LATERAL MENISCUS OF RIGHT KNEE, CURRENT, UNSPECIFIED TEAR TYPE, INITIAL ENCOUNTER: ICD-10-CM

## 2024-12-12 DIAGNOSIS — Z87.828 S/P ARTHROSCOPIC PARTIAL LATERAL MENISCECTOMY OF RIGHT KNEE: ICD-10-CM

## 2024-12-12 DIAGNOSIS — S83.241A OTHER TEAR OF MEDIAL MENISCUS, CURRENT INJURY, RIGHT KNEE, INITIAL ENCOUNTER: ICD-10-CM

## 2024-12-12 DIAGNOSIS — S83.511A RUPTURE OF ANTERIOR CRUCIATE LIGAMENT OF RIGHT KNEE, INITIAL ENCOUNTER: ICD-10-CM

## 2024-12-12 DIAGNOSIS — Z87.828 S/P ARTHROSCOPIC PARTIAL MEDIAL MENISCECTOMY OF RIGHT KNEE: ICD-10-CM

## 2024-12-12 DIAGNOSIS — Z87.39 S/P ARTHROSCOPIC RECONSTRUCTION OF ACL OF RIGHT KNEE USING QUADRICEPS TENDON AUTOGRAFT: Primary | ICD-10-CM

## 2024-12-12 DIAGNOSIS — Z98.890 S/P ARTHROSCOPIC PARTIAL LATERAL MENISCECTOMY OF RIGHT KNEE: ICD-10-CM

## 2024-12-12 DIAGNOSIS — Z98.890 S/P ARTHROSCOPIC RECONSTRUCTION OF ACL OF RIGHT KNEE USING QUADRICEPS TENDON AUTOGRAFT: Primary | ICD-10-CM

## 2024-12-12 DIAGNOSIS — Z98.890 S/P ARTHROSCOPIC PARTIAL MEDIAL MENISCECTOMY OF RIGHT KNEE: ICD-10-CM

## 2024-12-12 PROCEDURE — 97110 THERAPEUTIC EXERCISES: CPT

## 2024-12-12 PROCEDURE — 97112 NEUROMUSCULAR REEDUCATION: CPT

## 2024-12-12 NOTE — PROGRESS NOTES
Knee Post Operative Visit     Assesment:     34 y.o. male 4 weeks s/p surgical arthroscopy of the right knee with ACL reconstruction with quadriceps autograft with partial medial and lateral menisectomies, DOS: 11/08/2024.    Plan:    Post-Operative treatment:    Continue PT per protocol. Encouraged patient to continue to work on his knee ROM in flexion.  Script provided.    Imaging:    All imaging from today was reviewed by myself and explained to the patient.     Weight bearing:  as tolerated     ROM:  Full    Brace:  Wear brace at all times other than for showers, Keep brace locked in extension for ambulation, and May unlock brace for ROM exercises    DVT Prophylaxis:  Aspirin 81 mg oral twice daily x 6 weeks post-op and Ambulation    Follow up:   4 weeks, motion check        Patient was advised that if they have any fevers, chills, chest pain, shortness of breath, redness or drainage from the incision, please let our office know immediately.          Chief Complaint   Patient presents with    Right Knee - Follow-up       History of Present Illness:    The patient is a 34 y.o. male who is being evaluated post operatively 4 weeks s/p surgical arthroscopy of the right knee with ACL reconstruction with quadriceps autograft with partial medial and lateral menisectomies, DOS: 11/08/2024. Patient presents today with a friend who provided [place] translation throughout today's visit.    Since the prior visit, He reports  improvement.  He states that his pain is well-controlled at this time.  He denies use of medication for pain. He is using ice to control swelling    They have not started physical therapy. He is attending PT 2x/week.     The patient Aspirin 81 mg oral twice daily x 6 weeks and Ambulation for DVT ppx.  The patient has been ambulating with one crutch.    The patient has been ambulating with a TROM.    The patient denies any fevers, chills, calf pain, chest pain/shortness of breath, redness or drainage  "from the incision.         I have reviewed the past medical, surgical, social and family history, medications and allergies as documented in the EMR.    Review of systems: ROS is negative other than that noted in the HPI.  Constitutional: Negative for fatigue and fever.        Physical Exam:    Blood pressure 109/73, pulse 73, height 5' 6\" (1.676 m).    General/Constitutional: NAD, well developed, well nourished  HENT: Normocephalic, atraumatic  CV: Intact distal pulses, regular rate  Resp: No respiratory distress or labored breathing  Lymphatic: No lymphadenopathy palpated  Neuro: Alert and Oriented x 3, no focal deficits  Psych: Normal mood, normal affect, normal judgement, normal behavior  Skin: Warm, dry, no rashes, no erythema      Knee Exam (focused):                  RIGHT LEFT   ROM:   0-80 0-130   Palpation: Effusion mild negative     MJL tenderness Positive Negative     LJL tenderness Negative Negative   Instability: Varus stable stable     Valgus stable stable   Special Tests: Lachman Negative Negative     Posterior drawer Negative Negative     Anterior drawer Negative Negative     Pivot shift not tested not tested     Dial not tested not tested   Patella: Palpation no tenderness no tenderness     Mobility 1/4 1/4     Apprehension Negative Negative   Other: Single leg 1/4 squat not tested    Expected quad atrophy not tested      Incisions show no erythema, no drainage    LE NV Exam: +2 DP/PT pulses bilaterally  Sensation intact to light touch L2-S1 bilaterally     Bilateral hip ROM demonstrates no pain actively or passively    No calf tenderness to palpation bilaterally      Scribe Attestation      I,:  Brittany Anne am acting as a scribe while in the presence of the attending physician.:       I,:  Gigi Chapa, DO personally performed the services described in this documentation    as scribed in my presence.:             "

## 2024-12-12 NOTE — PROGRESS NOTES
Daily Note     Today's date: 2024  Patient name: Yasir Avalos  : 1990  MRN: [id number]  Referring provider: Elizabeth Segura,*  Dx:   Encounter Diagnosis     ICD-10-CM    1. S/P arthroscopic reconstruction of ACL of right knee using quadriceps tendon autograft  Z98.890     Z87.39       2. S/P arthroscopic partial medial meniscectomy of right knee  Z98.890     Z87.828       3. S/P arthroscopic partial lateral meniscectomy of right knee  Z98.890     Z87.828       4. Rupture of anterior cruciate ligament of right knee, initial encounter  S83.511A       5. Tear of lateral meniscus of right knee, current, unspecified tear type, initial encounter  S83.281A       6. Other tear of medial meniscus, current injury, right knee, initial encounter  S83.241A           Start Time: 0545  Stop Time: 0630  Total time in clinic (min): 45 minutes    Subjective: ( [id number] utilized throughout session) Patient states he is doing well after last session.  Has been walking with one crutch now.  States he was instructed to work more on his knee bending after his follow up with ortho.  Otherwise states things went well.      Objective: See treatment diary below      Assessment: Tolerated treatment well. Patient demonstrated fatigue post treatment, exhibited good technique with therapeutic exercises, and would benefit from continued PT. Discussed knee flexion based stretching and instructed patient that it is okay to push through slight pain so long as it isnt excessive at it will be necessary in order to gain more ROM.  Quad lag improved notably today compared to last session.  Recommended patient mainly focus on progressing both knee flexion and ext ROM in conjunction with quad based strength (SLR) leading into next week prior to re-assessment.  Plan to progress as symptoms an protocol allow.    Plan: Continue per plan of care.      Precautions: S/P ACL Reconstruction 4-weeks      Manuals   "11/26 11/29 12/2 12/5 12/9 12/12      Knee PROM       5 min (knee EXT)                                             Neuro Re-Ed     PATIENT EDU        Quad Sets HEP 5\"x10 5\"x10 5\"x10 10\"x20\" 10\"x20\" (foam under ankle) 10\"x20\" (foam under ankle)      Quad Set + SLR  3x5 3x5 (+strap assist) 3x5 No Assist 3x5 No Assist  3x10 No Assist      TKE Standing EOB     5\"x20 5\"x20 (YTB)       SL Hip ABD   2x10          Prone Hip EXT   2x10          Weight Shifts Fwd/Bck + Lat    5\"x10ea         SL Hip ADD             Patient EDU/Brace Management   MT          Clinic Walking w/ Single Crutch     4 Laps 4 Laps (no crutches)       Ther Ex             Heel Slides PROM + Strap HEP 5\"x10 5\"x10 5\"x10   10x10\"      Prone Hang Stretch HEP            Seated HS Stretch HEP      3x30\" (+overpressure  @ knee)      Calf Stretch Supine  3x30\" 3x30\"          Heel Prop LLLD Stretch    5min (#2)   5min (#5)                                             Ther Activity                                       Gait Training                                       Modalities                                            "

## 2024-12-16 ENCOUNTER — OFFICE VISIT (OUTPATIENT)
Dept: PHYSICAL THERAPY | Facility: REHABILITATION | Age: 34
End: 2024-12-16
Payer: COMMERCIAL

## 2024-12-16 DIAGNOSIS — Z87.828 S/P ARTHROSCOPIC PARTIAL MEDIAL MENISCECTOMY OF RIGHT KNEE: ICD-10-CM

## 2024-12-16 DIAGNOSIS — Z98.890 S/P ARTHROSCOPIC PARTIAL MEDIAL MENISCECTOMY OF RIGHT KNEE: ICD-10-CM

## 2024-12-16 DIAGNOSIS — Z87.39 S/P ARTHROSCOPIC RECONSTRUCTION OF ACL OF RIGHT KNEE USING QUADRICEPS TENDON AUTOGRAFT: Primary | ICD-10-CM

## 2024-12-16 DIAGNOSIS — Z98.890 S/P ARTHROSCOPIC RECONSTRUCTION OF ACL OF RIGHT KNEE USING QUADRICEPS TENDON AUTOGRAFT: Primary | ICD-10-CM

## 2024-12-16 DIAGNOSIS — Z87.828 S/P ARTHROSCOPIC PARTIAL LATERAL MENISCECTOMY OF RIGHT KNEE: ICD-10-CM

## 2024-12-16 DIAGNOSIS — S83.511A RUPTURE OF ANTERIOR CRUCIATE LIGAMENT OF RIGHT KNEE, INITIAL ENCOUNTER: ICD-10-CM

## 2024-12-16 DIAGNOSIS — S83.281A TEAR OF LATERAL MENISCUS OF RIGHT KNEE, CURRENT, UNSPECIFIED TEAR TYPE, INITIAL ENCOUNTER: ICD-10-CM

## 2024-12-16 DIAGNOSIS — S83.241A OTHER TEAR OF MEDIAL MENISCUS, CURRENT INJURY, RIGHT KNEE, INITIAL ENCOUNTER: ICD-10-CM

## 2024-12-16 DIAGNOSIS — Z98.890 S/P ARTHROSCOPIC PARTIAL LATERAL MENISCECTOMY OF RIGHT KNEE: ICD-10-CM

## 2024-12-16 PROCEDURE — 97110 THERAPEUTIC EXERCISES: CPT

## 2024-12-16 PROCEDURE — 97112 NEUROMUSCULAR REEDUCATION: CPT

## 2024-12-16 NOTE — PROGRESS NOTES
"Daily Note     Today's date: 2024  Patient name: Yasir Avalos  : 1990  MRN: [id number]  Referring provider: Elizabeth Segura,*  Dx:   Encounter Diagnosis     ICD-10-CM    1. S/P arthroscopic reconstruction of ACL of right knee using quadriceps tendon autograft  Z98.890     Z87.39       2. S/P arthroscopic partial medial meniscectomy of right knee  Z98.890     Z87.828       3. S/P arthroscopic partial lateral meniscectomy of right knee  Z98.890     Z87.828       4. Rupture of anterior cruciate ligament of right knee, initial encounter  S83.511A       5. Tear of lateral meniscus of right knee, current, unspecified tear type, initial encounter  S83.281A       6. Other tear of medial meniscus, current injury, right knee, initial encounter  S83.241A                      Subjective: ( [id number] utilized throughout session) Patient states he is doing well with ambulating without crutches and this is better day by day.  Notes no issues following last session and has allocated more time to stretching at home and this is helping.      Objective: See treatment diary below      Assessment: Tolerated treatment well. Patient demonstrated fatigue post treatment, exhibited good technique with therapeutic exercises, and would benefit from continued PT. Knee flexion ROM improved compared to last session and appears more tolerable at end ranges.  Time spent reviewing gait without crutches and reminded patient to ensure brace is locked as his is up and about.  Introduced tandem stance balance as well as gentle mini squats with appropriate symptom response.  Plan to re-assess at time of NV.    Plan: Continue per plan of care.      Precautions: S/P ACL Reconstruction 4-weeks      Manuals      Knee PROM       5 min (knee EXT)                                             Neuro Re-Ed     PATIENT EDU        Quad Sets HEP 5\"x10 5\"x10 5\"x10 10\"x20\" 10\"x20\" " "(foam under ankle) 10\"x20\" (foam under ankle)      Quad Set + SLR  3x5 3x5 (+strap assist) 3x5 No Assist 3x5 No Assist  3x10 No Assist 3x10 No Assist     TKE Standing EOB     5\"x20 5\"x20 (YTB)  5\"x20 (RTB)     SL Hip ABD   2x10          Prone Hip EXT   2x10          Weight Shifts Fwd/Bck + Lat    5\"x10ea         SL Hip ADD             Patient EDU/Brace Management   MT          Clinic Walking w/ Single Crutch     4 Laps 4 Laps (no crutches)       Mini Squats Bar Support Brace On        2x10 (to 60)     Tandem Stance Balance        3x30\"     Ther Ex             Heel Slides PROM + Strap HEP 5\"x10 5\"x10 5\"x10   10x10\" 10x10\"     Prone Hang Stretch HEP            Seated HS Stretch HEP      3x30\" (+overpressure  @ knee) 3x30\" (+overpressure  @ knee)     Calf Stretch Supine  3x30\" 3x30\"     3x30\"     Heel Prop LLLD Stretch    5min (#2)   5min (#5) 5min (#7.5)                                            Ther Activity                                       Gait Training                                       Modalities                                            "

## 2024-12-19 ENCOUNTER — EVALUATION (OUTPATIENT)
Dept: PHYSICAL THERAPY | Facility: REHABILITATION | Age: 34
End: 2024-12-19
Payer: COMMERCIAL

## 2024-12-19 DIAGNOSIS — Z87.828 S/P ARTHROSCOPIC PARTIAL MEDIAL MENISCECTOMY OF RIGHT KNEE: ICD-10-CM

## 2024-12-19 DIAGNOSIS — Z87.39 S/P ARTHROSCOPIC RECONSTRUCTION OF ACL OF RIGHT KNEE USING QUADRICEPS TENDON AUTOGRAFT: Primary | ICD-10-CM

## 2024-12-19 DIAGNOSIS — Z87.828 S/P ARTHROSCOPIC PARTIAL LATERAL MENISCECTOMY OF RIGHT KNEE: ICD-10-CM

## 2024-12-19 DIAGNOSIS — S83.511A RUPTURE OF ANTERIOR CRUCIATE LIGAMENT OF RIGHT KNEE, INITIAL ENCOUNTER: ICD-10-CM

## 2024-12-19 DIAGNOSIS — Z98.890 S/P ARTHROSCOPIC PARTIAL MEDIAL MENISCECTOMY OF RIGHT KNEE: ICD-10-CM

## 2024-12-19 DIAGNOSIS — S83.281A TEAR OF LATERAL MENISCUS OF RIGHT KNEE, CURRENT, UNSPECIFIED TEAR TYPE, INITIAL ENCOUNTER: ICD-10-CM

## 2024-12-19 DIAGNOSIS — Z98.890 S/P ARTHROSCOPIC RECONSTRUCTION OF ACL OF RIGHT KNEE USING QUADRICEPS TENDON AUTOGRAFT: Primary | ICD-10-CM

## 2024-12-19 DIAGNOSIS — Z98.890 S/P ARTHROSCOPIC PARTIAL LATERAL MENISCECTOMY OF RIGHT KNEE: ICD-10-CM

## 2024-12-19 DIAGNOSIS — S83.241A OTHER TEAR OF MEDIAL MENISCUS, CURRENT INJURY, RIGHT KNEE, INITIAL ENCOUNTER: ICD-10-CM

## 2024-12-19 PROCEDURE — 97112 NEUROMUSCULAR REEDUCATION: CPT

## 2024-12-19 NOTE — PROGRESS NOTES
PT Re-Evaluation     Today's date: 2024  Patient name: Yasir Avalos  : 1990  MRN: [id number]  Referring provider: Elizabeth Segura,*  Dx:   Encounter Diagnosis     ICD-10-CM    1. S/P arthroscopic reconstruction of ACL of right knee using quadriceps tendon autograft  Z98.890     Z87.39       2. S/P arthroscopic partial medial meniscectomy of right knee  Z98.890     Z87.828       3. S/P arthroscopic partial lateral meniscectomy of right knee  Z98.890     Z87.828       4. Rupture of anterior cruciate ligament of right knee, initial encounter  S83.511A       5. Tear of lateral meniscus of right knee, current, unspecified tear type, initial encounter  S83.281A       6. Other tear of medial meniscus, current injury, right knee, initial encounter  S83.241A           Start Time: 0545  Stop Time: 0630  Total time in clinic (min): 45 minutes    Assessment  Impairments: abnormal gait, abnormal muscle tone, abnormal or restricted ROM, activity intolerance, impaired balance, impaired physical strength, lacks appropriate home exercise program, pain with function, weight-bearing intolerance, poor posture  and poor body mechanics  Functional limitations: prolonged standing/walking, squating, lifting, stair navigation  Symptom irritability: moderate    Assessment details: Patient is a 34 y.o. male presenting to re-examination with chief complaint of R knee pain and stiffness 6-weeks S/P arthroscopic ACL reconstruction w/ quad autograft + medial/lateral meniscectomy. Patient exhibits favorable progress toward objective and functional goals at time of reexamination. Patient exhibits improvements with knee ROM, quad activation/knee strength, and improved gait since initiating PT however continues to have limitations compared to prior level of function. Progressed to ambulation with brace unlocked as patient was capable of full knee extension and resolved quad lag. Discontinued brace for sleep  following today's session as well. Remaining functional limitations include those listed below. As a result of impairments patient has continued limitations with daily and functional activities and would benefit from continued skilled PT interventions to address these impairments in order to maximize function.  Understanding of Dx/Px/POC: good     Prognosis: good    Goals  Impairment Goals: 4-6 weeks  - Patient to decrease pain to 1-2/10 (MET)  - Patient to improve knee AROM to equal to uninvolved side (PARTIALLY MET)  - Patient to increase knee strength to 5/5 throughout (PARTIALLY MET)  - Patient to increase hip strength to 5/5 throughout (PARTIALLY MET)    Functional Goals: by discharge  - Patient to discharge to independent HEP (PARTIALLY MET)  - Patient to improve subjective functional level to 85% (PARTIALLY MET)  - Patient to ambulate in home and community without increased pain or difficulty (PARTIALLY MET)  - Patient to ascend and descend stairs without increased pain or difficulty (PARTIALLY MET)  - Patient to complete sit to stand transfers without increased pain or difficulty (PARTIALLY MET)      Plan  Patient would benefit from: skilled physical therapy  Referral necessary: No    Planned therapy interventions: manual therapy, joint mobilization, nerve gliding, neuromuscular re-education, patient/caregiver education, postural training, strengthening, stretching, therapeutic activities, therapeutic exercise, home exercise program, gait training, functional ROM exercises, behavior modification, body mechanics training, balance, abdominal trunk stabilization and activity modification    Frequency: 2x week  Duration in weeks: 6  Plan of Care beginning date: 12/19/2024  Plan of Care expiration date: 1/31/2025  Treatment plan discussed with: patient      Subjective Evaluation    History of Present Illness  Date of surgery: 11/8/2024  Mechanism of injury: surgery  Mechanism of injury:  004419 utilized  throughout encounter    No longer taking pain medication as pain is much improved but notes some pain when challenging knee flexion ROM with stretches    Has been able to walk without AD at this point without any issues, reports improved tolerability for WB without pain    Notes that he has been ambulating in the home and community in brace without issue    States swelling is improved          Not a recurrent problem   Quality of life: good    Patient Goals  Patient goals for therapy: increased strength, independence with ADLs/IADLs, return to sport/leisure activities, increased motion, improved balance, decreased pain and decreased edema    Pain  Current pain ratin  At best pain ratin  At worst pain ratin  Quality: tight, sharp and dull ache  Relieving factors: ice  Aggravating factors: lifting, stair climbing, walking, standing and sitting  Progression: improved    Treatments  No previous or current treatments      Objective     Observations     Right Knee   Positive for edema and incision.     Additional Observation Details  Incision appears to be well maintained, no obvious signs of infection, normal post surgical swelling/heat/redness        Palpation     Right   Hypertonic in the distal biceps femoris, distal semimembranosus, distal semitendinosus, lateral gastrocnemius and medial gastrocnemius.   Tenderness of the distal biceps femoris, distal semimembranosus, distal semitendinosus, rectus femoris, vastus lateralis and vastus medialis.     Tenderness     Right Knee   No tenderness in the lateral joint line, lateral patella, medial joint line, medial patella, patellar tendon, popliteal fossa and quadriceps tendon.     Active Range of Motion   Left Knee   Flexion: 135 degrees WFL  Extension: 2 degrees WFL    Right Knee   Flexion: 110 degrees with pain  Extension: 0 degrees     Strength/Myotome Testing     Left Knee   Flexion: 5  Extension: 5  Quadriceps contraction: good    Right Knee   Quadriceps  "contraction: good    Additional Strength Details  Quad lag resolved R LE    Swelling     Left Knee Girth Measurement (cm)   Joint line: 36 cm    Right Knee Girth Measurement (cm)   Joint line: 40 cm    Functional Assessment      Squat  No trunk lean left, no left valgus, no left tibial anterior translation beyond toes, no right valgus and no right tibial anterior translation beyond toes.     Comments  Capable of mini squat to 60 degrees with no pain and equal WB    SLS Balance: R LE   Trial 1: 12sec  Trial 2: 10.43 sec  Trial 3: 11.2 sec    L LE (30 sec sustained)             Precautions: S/P ACL Reconstruction 6-weeks      Manuals 11/18 11/26 11/29 12/2 12/5 12/9 12/12 12/16 12/19    Knee PROM       5 min (knee EXT)                                             Neuro Re-Ed     PATIENT EDU    RE-EVAL    Quad Sets HEP 5\"x10 5\"x10 5\"x10 10\"x20\" 10\"x20\" (foam under ankle) 10\"x20\" (foam under ankle)      Quad Set + SLR  3x5 3x5 (+strap assist) 3x5 No Assist 3x5 No Assist  3x10 No Assist 3x10 No Assist 3x10 No Assist    TKE Standing EOB     5\"x20 5\"x20 (YTB)  5\"x20 (RTB)     SL Hip ABD   2x10          Prone Hip EXT   2x10          Weight Shifts Fwd/Bck + Lat    5\"x10ea         SL Hip ADD             Patient EDU/Brace Management   MT          Clinic Walking w/ Single Crutch     4 Laps 4 Laps (no crutches)   4 Laps (brace unlocked - march over cones)    Mini Squats Bar Support Brace On        2x10 (to 60)     Tandem Stance Balance        3x30\" SLS 2x30\"    Ther Ex             Heel Slides PROM + Strap HEP 5\"x10 5\"x10 5\"x10   10x10\" 10x10\"     Prone Hang Stretch HEP            Seated HS Stretch HEP      3x30\" (+overpressure  @ knee) 3x30\" (+overpressure  @ knee)     Calf Stretch Supine  3x30\" 3x30\"     3x30\"     Heel Prop LLLD Stretch    5min (#2)   5min (#5) 5min (#7.5)                                            Ther Activity                                       Gait Training                                       Modalities  "

## 2024-12-23 ENCOUNTER — OFFICE VISIT (OUTPATIENT)
Dept: PHYSICAL THERAPY | Facility: REHABILITATION | Age: 34
End: 2024-12-23
Payer: COMMERCIAL

## 2024-12-23 DIAGNOSIS — Z87.828 S/P ARTHROSCOPIC PARTIAL MEDIAL MENISCECTOMY OF RIGHT KNEE: ICD-10-CM

## 2024-12-23 DIAGNOSIS — S83.241A OTHER TEAR OF MEDIAL MENISCUS, CURRENT INJURY, RIGHT KNEE, INITIAL ENCOUNTER: ICD-10-CM

## 2024-12-23 DIAGNOSIS — Z98.890 S/P ARTHROSCOPIC PARTIAL MEDIAL MENISCECTOMY OF RIGHT KNEE: ICD-10-CM

## 2024-12-23 DIAGNOSIS — Z87.39 S/P ARTHROSCOPIC RECONSTRUCTION OF ACL OF RIGHT KNEE USING QUADRICEPS TENDON AUTOGRAFT: ICD-10-CM

## 2024-12-23 DIAGNOSIS — Z87.828 S/P ARTHROSCOPIC PARTIAL LATERAL MENISCECTOMY OF RIGHT KNEE: ICD-10-CM

## 2024-12-23 DIAGNOSIS — S83.511A RUPTURE OF ANTERIOR CRUCIATE LIGAMENT OF RIGHT KNEE, INITIAL ENCOUNTER: Primary | ICD-10-CM

## 2024-12-23 DIAGNOSIS — Z98.890 S/P ARTHROSCOPIC PARTIAL LATERAL MENISCECTOMY OF RIGHT KNEE: ICD-10-CM

## 2024-12-23 DIAGNOSIS — Z98.890 S/P ARTHROSCOPIC RECONSTRUCTION OF ACL OF RIGHT KNEE USING QUADRICEPS TENDON AUTOGRAFT: ICD-10-CM

## 2024-12-23 DIAGNOSIS — S83.281A TEAR OF LATERAL MENISCUS OF RIGHT KNEE, CURRENT, UNSPECIFIED TEAR TYPE, INITIAL ENCOUNTER: ICD-10-CM

## 2024-12-23 PROCEDURE — 97112 NEUROMUSCULAR REEDUCATION: CPT

## 2024-12-23 PROCEDURE — 97110 THERAPEUTIC EXERCISES: CPT

## 2024-12-23 NOTE — PROGRESS NOTES
Daily Note     Today's date: 2024  Patient name: Yasir Avalos  : 1990  MRN: [id number]  Referring provider: Elizabeth Segura,*  Dx:   Encounter Diagnosis     ICD-10-CM    1. Rupture of anterior cruciate ligament of right knee, initial encounter  S83.511A       2. Tear of lateral meniscus of right knee, current, unspecified tear type, initial encounter  S83.281A       3. Other tear of medial meniscus, current injury, right knee, initial encounter  S83.241A       4. S/P arthroscopic partial lateral meniscectomy of right knee  Z98.890     Z87.828       5. S/P arthroscopic partial medial meniscectomy of right knee  Z98.890     Z87.828       6. S/P arthroscopic reconstruction of ACL of right knee using quadriceps tendon autograft  Z98.890     Z87.39           Start Time: 0545  Stop Time: 0630  Total time in clinic (min): 45 minutes    Subjective: ( [id number] utilized throughout session) Patient states he is doing well with ambulating without crutches and this is better day by day.  Notes no issues following last session and has allocated more time to stretching at home and this is helping.      Objective: See treatment diary below      Assessment: Tolerated treatment well. Patient demonstrated fatigue post treatment, exhibited good technique with therapeutic exercises, and would benefit from continued PT. Progressed closed chain quad strengthening within tolerability. Has difficulties with eccentric control on release both on leg press and step down.  Gait more normalized today during gait training and encouraged patient to continue to practice stance time and heel strike. Provided step-ups to home program and educated patient regarding proper dosage/form/symptom response. Plan to progress as symptoms and protocol allow.    Plan: Continue per plan of care.      Precautions: S/P ACL Reconstruction 6-weeks      Manuals             Knee PROM                                      "               Neuro Re-Ed             Quad Sets             Quad Set + SLR 3x10            TKE Standing EOB 5\"x20 (GTB)            SL Hip ABD             Prone Hip EXT             Step Up + Down (+eccentric focus) 3x5 (8in)            Clinic Walking Cone March 4 Laps            Mini Squats Bar Support Brace On 2x10 (to 90)            SLS Balance 3x30\"            Ther Ex             Rec Bike 6min            Heel Slides PROM + Strap             Prone Hang Stretch             Seated HS Stretch             Calf Stretch Supine             Heel Prop LLLD Stretch             Leg Press 3x10 (#95)                                      Ther Activity                                       Gait Training                                       Modalities                                            "

## 2024-12-26 ENCOUNTER — OFFICE VISIT (OUTPATIENT)
Dept: PHYSICAL THERAPY | Facility: REHABILITATION | Age: 34
End: 2024-12-26
Payer: COMMERCIAL

## 2024-12-26 DIAGNOSIS — Z87.828 S/P ARTHROSCOPIC PARTIAL MEDIAL MENISCECTOMY OF RIGHT KNEE: ICD-10-CM

## 2024-12-26 DIAGNOSIS — Z98.890 S/P ARTHROSCOPIC PARTIAL MEDIAL MENISCECTOMY OF RIGHT KNEE: ICD-10-CM

## 2024-12-26 DIAGNOSIS — S83.241A OTHER TEAR OF MEDIAL MENISCUS, CURRENT INJURY, RIGHT KNEE, INITIAL ENCOUNTER: ICD-10-CM

## 2024-12-26 DIAGNOSIS — Z87.828 S/P ARTHROSCOPIC PARTIAL LATERAL MENISCECTOMY OF RIGHT KNEE: ICD-10-CM

## 2024-12-26 DIAGNOSIS — S83.281A TEAR OF LATERAL MENISCUS OF RIGHT KNEE, CURRENT, UNSPECIFIED TEAR TYPE, INITIAL ENCOUNTER: ICD-10-CM

## 2024-12-26 DIAGNOSIS — S83.511A RUPTURE OF ANTERIOR CRUCIATE LIGAMENT OF RIGHT KNEE, INITIAL ENCOUNTER: Primary | ICD-10-CM

## 2024-12-26 DIAGNOSIS — Z87.39 S/P ARTHROSCOPIC RECONSTRUCTION OF ACL OF RIGHT KNEE USING QUADRICEPS TENDON AUTOGRAFT: ICD-10-CM

## 2024-12-26 DIAGNOSIS — Z98.890 S/P ARTHROSCOPIC PARTIAL LATERAL MENISCECTOMY OF RIGHT KNEE: ICD-10-CM

## 2024-12-26 DIAGNOSIS — Z98.890 S/P ARTHROSCOPIC RECONSTRUCTION OF ACL OF RIGHT KNEE USING QUADRICEPS TENDON AUTOGRAFT: ICD-10-CM

## 2024-12-26 PROCEDURE — 97110 THERAPEUTIC EXERCISES: CPT

## 2024-12-26 PROCEDURE — 97112 NEUROMUSCULAR REEDUCATION: CPT

## 2024-12-26 NOTE — PROGRESS NOTES
Daily Note     Today's date: 2024  Patient name: Yasir Avalos  : 1990  MRN: [id number]  Referring provider: Elizabeth Segura,*  Dx:   Encounter Diagnosis     ICD-10-CM    1. Rupture of anterior cruciate ligament of right knee, initial encounter  S83.511A       2. Tear of lateral meniscus of right knee, current, unspecified tear type, initial encounter  S83.281A       3. Other tear of medial meniscus, current injury, right knee, initial encounter  S83.241A       4. S/P arthroscopic partial lateral meniscectomy of right knee  Z98.890     Z87.828       5. S/P arthroscopic partial medial meniscectomy of right knee  Z98.890     Z87.828       6. S/P arthroscopic reconstruction of ACL of right knee using quadriceps tendon autograft  Z98.890     Z87.39           Start Time: 0545  Stop Time: 0630  Total time in clinic (min): 45 minutes    Subjective: ( [id number] utilized throughout session) Patient states he felt okay after last session despite some increased soreness in the knee but this improved into the next day.  Notes progressions to HEP going well at home currently.      Objective: See treatment diary below      Assessment: Tolerated treatment well. Patient demonstrated fatigue post treatment, exhibited good technique with therapeutic exercises, and would benefit from continued PT. Session focused on progressing LE strength within tolerability.  Improved eccentric control noted on steps as well as leg press.  Introduced banded lateral walk to progress hip strengthening within HEP. Discontinued brace following today's session in accordance with protocol requirements both for normal walking and sleeping. Plan to progress as symptoms and protocol allow.    Plan: Continue per plan of care.      Precautions: S/P ACL Reconstruction 7-weeks      Manuals            Knee PROM                                                    Neuro Re-Ed             Quad Sets            "  Quad Set + SLR 3x10            TKE Standing EOB 5\"x20 (GTB)            SL Hip ABD             Prone Hip EXT             Step Up + Down (+eccentric focus) 3x5 (8in) 2x10 (8in)           Clinic Walking Cone March 4 Laps 4 Laps           Mini Squats Bar Support Brace On 2x10 (to 90)            SLS Balance 3x30\" 3x30\" (+AIREX)  3x5 (+clock taps)           Lateral Band Walk  4 Laps (GTB)           Ther Ex             Rec Bike 6min 10min (lvl 3)           Heel Slides PROM + Strap             Prone Hang Stretch             Seated HS Stretch             Calf Stretch Supine             Heel Prop LLLD Stretch             Leg Press (B/L) 3x10 (#95) 3x10 (#115)           Leg Press (U/L)  2x10 (#75)                        Ther Activity                                       Gait Training                                       Modalities                                            "

## 2024-12-30 ENCOUNTER — OFFICE VISIT (OUTPATIENT)
Dept: PHYSICAL THERAPY | Facility: REHABILITATION | Age: 34
End: 2024-12-30
Payer: COMMERCIAL

## 2024-12-30 DIAGNOSIS — S83.241A OTHER TEAR OF MEDIAL MENISCUS, CURRENT INJURY, RIGHT KNEE, INITIAL ENCOUNTER: ICD-10-CM

## 2024-12-30 DIAGNOSIS — S83.281A TEAR OF LATERAL MENISCUS OF RIGHT KNEE, CURRENT, UNSPECIFIED TEAR TYPE, INITIAL ENCOUNTER: ICD-10-CM

## 2024-12-30 DIAGNOSIS — Z98.890 S/P ARTHROSCOPIC PARTIAL LATERAL MENISCECTOMY OF RIGHT KNEE: ICD-10-CM

## 2024-12-30 DIAGNOSIS — Z87.828 S/P ARTHROSCOPIC PARTIAL MEDIAL MENISCECTOMY OF RIGHT KNEE: ICD-10-CM

## 2024-12-30 DIAGNOSIS — Z98.890 S/P ARTHROSCOPIC PARTIAL MEDIAL MENISCECTOMY OF RIGHT KNEE: ICD-10-CM

## 2024-12-30 DIAGNOSIS — Z87.39 S/P ARTHROSCOPIC RECONSTRUCTION OF ACL OF RIGHT KNEE USING QUADRICEPS TENDON AUTOGRAFT: ICD-10-CM

## 2024-12-30 DIAGNOSIS — Z87.828 S/P ARTHROSCOPIC PARTIAL LATERAL MENISCECTOMY OF RIGHT KNEE: ICD-10-CM

## 2024-12-30 DIAGNOSIS — Z98.890 S/P ARTHROSCOPIC RECONSTRUCTION OF ACL OF RIGHT KNEE USING QUADRICEPS TENDON AUTOGRAFT: ICD-10-CM

## 2024-12-30 DIAGNOSIS — S83.511A RUPTURE OF ANTERIOR CRUCIATE LIGAMENT OF RIGHT KNEE, INITIAL ENCOUNTER: Primary | ICD-10-CM

## 2024-12-30 PROCEDURE — 97112 NEUROMUSCULAR REEDUCATION: CPT

## 2024-12-30 PROCEDURE — 97110 THERAPEUTIC EXERCISES: CPT

## 2024-12-30 NOTE — PROGRESS NOTES
"Daily Note     Today's date: 2024  Patient name: Yasir Avalos  : 1990  MRN: [id number]  Referring provider: Elizabeth Segura,*  Dx:   Encounter Diagnosis     ICD-10-CM    1. Rupture of anterior cruciate ligament of right knee, initial encounter  S83.511A       2. Tear of lateral meniscus of right knee, current, unspecified tear type, initial encounter  S83.281A       3. Other tear of medial meniscus, current injury, right knee, initial encounter  S83.241A       4. S/P arthroscopic partial lateral meniscectomy of right knee  Z98.890     Z87.828       5. S/P arthroscopic partial medial meniscectomy of right knee  Z98.890     Z87.828       6. S/P arthroscopic reconstruction of ACL of right knee using quadriceps tendon autograft  Z98.890     Z87.39           Start Time: 0545  Stop Time: 0630  Total time in clinic (min): 45 minutes    Subjective: ( [id number] utilized throughout session) Patient reports no issues after last session. States walking is getting more normal though notes a bit of discomfort with prolonged walking but this resolves after rest.  Reports no challenges with HEP currently.      Objective: See treatment diary below      Assessment: Tolerated treatment well. Patient demonstrated fatigue post treatment, exhibited good technique with therapeutic exercises, and would benefit from continued PT. Session focused on progressing LE strength within tolerability.  Session started with focus on improving knee EXT ROM with prone prop stretch and following with knee based strength.  Provided bridging to progress glute/HS strength. Plan to continue to progress as able    Plan: Continue per plan of care.      Precautions: S/P ACL Reconstruction 7-weeks      Manuals           Knee PROM                                                    Neuro Re-Ed             Quad Sets             Quad Set + SLR 3x10            TKE Standing EOB 5\"x20 (GTB)            SL Hip " "ABD             Prone Hip EXT             Step Up + Down (+eccentric focus) 3x5 (8in) 2x10 (8in) 2x10 (8in)          Clinic Walking Cone March 4 Laps 4 Laps           Lateral Step Down   2x10 (4in)          Mini Squats Bar Support Brace On 2x10 (to 90)  Offset STS 2x10          SLS Balance 3x30\" 3x30\" (+AIREX)  3x5 (+clock taps) 3x5 (+clock taps on airex)          Lateral Band Walk  4 Laps (GTB) 4 Laps (GTB)          Monster Walk vs TB   4 Laps (GTB)          Ther Ex             Rec Bike 6min 10min (lvl 3) 10min (lvl 4)          Heel Slides PROM + Strap             Prone Hang Stretch   5min          Seated HS Stretch             Calf Stretch Supine             Heel Prop LLLD Stretch             Leg Press (B/L) 3x10 (#95) 3x10 (#115) 3x10 (#115)          Leg Press (U/L)  2x10 (#75) 2x10 (#75)                       Ther Activity                                       Gait Training                                       Modalities                                            "

## 2025-01-02 ENCOUNTER — OFFICE VISIT (OUTPATIENT)
Dept: PHYSICAL THERAPY | Facility: REHABILITATION | Age: 35
End: 2025-01-02
Payer: COMMERCIAL

## 2025-01-02 DIAGNOSIS — Z98.890 S/P ARTHROSCOPIC RECONSTRUCTION OF ACL OF RIGHT KNEE USING QUADRICEPS TENDON AUTOGRAFT: ICD-10-CM

## 2025-01-02 DIAGNOSIS — Z87.828 S/P ARTHROSCOPIC PARTIAL MEDIAL MENISCECTOMY OF RIGHT KNEE: ICD-10-CM

## 2025-01-02 DIAGNOSIS — Z87.39 S/P ARTHROSCOPIC RECONSTRUCTION OF ACL OF RIGHT KNEE USING QUADRICEPS TENDON AUTOGRAFT: ICD-10-CM

## 2025-01-02 DIAGNOSIS — S83.241A OTHER TEAR OF MEDIAL MENISCUS, CURRENT INJURY, RIGHT KNEE, INITIAL ENCOUNTER: ICD-10-CM

## 2025-01-02 DIAGNOSIS — S83.281A TEAR OF LATERAL MENISCUS OF RIGHT KNEE, CURRENT, UNSPECIFIED TEAR TYPE, INITIAL ENCOUNTER: ICD-10-CM

## 2025-01-02 DIAGNOSIS — Z98.890 S/P ARTHROSCOPIC PARTIAL MEDIAL MENISCECTOMY OF RIGHT KNEE: ICD-10-CM

## 2025-01-02 DIAGNOSIS — S83.511A RUPTURE OF ANTERIOR CRUCIATE LIGAMENT OF RIGHT KNEE, INITIAL ENCOUNTER: Primary | ICD-10-CM

## 2025-01-02 DIAGNOSIS — Z98.890 S/P ARTHROSCOPIC PARTIAL LATERAL MENISCECTOMY OF RIGHT KNEE: ICD-10-CM

## 2025-01-02 DIAGNOSIS — Z87.828 S/P ARTHROSCOPIC PARTIAL LATERAL MENISCECTOMY OF RIGHT KNEE: ICD-10-CM

## 2025-01-02 PROCEDURE — 97110 THERAPEUTIC EXERCISES: CPT

## 2025-01-02 PROCEDURE — 97112 NEUROMUSCULAR REEDUCATION: CPT

## 2025-01-02 NOTE — PROGRESS NOTES
"Daily Note     Today's date: 2025  Patient name: Yasir Avalos  : 1990  MRN: [id number]  Referring provider: Elizabeth Segura,*  Dx:   Encounter Diagnosis     ICD-10-CM    1. Rupture of anterior cruciate ligament of right knee, initial encounter  S83.511A       2. Tear of lateral meniscus of right knee, current, unspecified tear type, initial encounter  S83.281A       3. Other tear of medial meniscus, current injury, right knee, initial encounter  S83.241A       4. S/P arthroscopic partial lateral meniscectomy of right knee  Z98.890     Z87.828       5. S/P arthroscopic partial medial meniscectomy of right knee  Z98.890     Z87.828       6. S/P arthroscopic reconstruction of ACL of right knee using quadriceps tendon autograft  Z98.890     Z87.39           Start Time: 0545  Stop Time: 0630  Total time in clinic (min): 45 minutes    Subjective: ( [id number] utilized throughout session) Patient reports no issues after last session. Reports no challenges with HEP currently.      Objective: See treatment diary below      Assessment: Tolerated treatment well. Patient demonstrated fatigue post treatment, exhibited good technique with therapeutic exercises, and would benefit from continued PT. Session focused on progressing LE strength within tolerability.  Progressed balance to incorporate pertubation training with ball toss with no pain and good stability observed at the knee.  Slight difficulty when trialed on airex so held for today. Continues to respond well to current quad based strength program.  Plan to continue to progress as protocol allows    Plan: Continue per plan of care.      Precautions: S/P ACL Reconstruction 7-weeks      Manuals  1/2         Knee PROM                                                    Neuro Re-Ed             Quad Sets             Quad Set + SLR 3x10            TKE Standing EOB 5\"x20 (GTB)            SL Hip ABD             Prone Hip " "EXT             Step Up + Down (+eccentric focus) 3x5 (8in) 2x10 (8in) 2x10 (8in)          Clinic Walking [facility] 4 Laps 4 Laps           Lateral Step Down   2x10 (4in) 2x10 (6in)         Mini Squats Bar Support Brace On 2x10 (to 90)  Offset STS 2x10 2x10 (BOSU Squat)         SLS Balance 3x30\" 3x30\" (+AIREX)  3x5 (+clock taps) 3x5 (+clock taps on airex) 3x30\" (+ Ball toss)         Lateral Band Walk  4 Laps (GTB) 4 Laps (GTB) 4 Laps (GTB)         Monster Walk vs TB   4 Laps (GTB) 4 Laps (GTB)         Ther Ex             Rec Bike 6min 10min (lvl 3) 10min (lvl 4) 10min (lvl 5)         Heel Slides PROM + Strap             Prone Hang Stretch   5min 5min         Seated HS Stretch             Calf Stretch Supine             Heel Prop LLLD Stretch             Leg Press (B/L) 3x10 (#95) 3x10 (#115) 3x10 (#115) 3x10 (#115)         Leg Press (U/L)  2x10 (#75) 2x10 (#75) 2x10 (#75)                      Ther Activity                                       Gait Training                                       Modalities                                            "

## 2025-01-06 ENCOUNTER — OFFICE VISIT (OUTPATIENT)
Dept: PHYSICAL THERAPY | Facility: REHABILITATION | Age: 35
End: 2025-01-06
Payer: COMMERCIAL

## 2025-01-06 DIAGNOSIS — Z98.890 S/P ARTHROSCOPIC PARTIAL MEDIAL MENISCECTOMY OF RIGHT KNEE: ICD-10-CM

## 2025-01-06 DIAGNOSIS — S83.281A TEAR OF LATERAL MENISCUS OF RIGHT KNEE, CURRENT, UNSPECIFIED TEAR TYPE, INITIAL ENCOUNTER: ICD-10-CM

## 2025-01-06 DIAGNOSIS — Z87.828 S/P ARTHROSCOPIC PARTIAL MEDIAL MENISCECTOMY OF RIGHT KNEE: ICD-10-CM

## 2025-01-06 DIAGNOSIS — S83.511A RUPTURE OF ANTERIOR CRUCIATE LIGAMENT OF RIGHT KNEE, INITIAL ENCOUNTER: Primary | ICD-10-CM

## 2025-01-06 DIAGNOSIS — Z98.890 S/P ARTHROSCOPIC RECONSTRUCTION OF ACL OF RIGHT KNEE USING QUADRICEPS TENDON AUTOGRAFT: ICD-10-CM

## 2025-01-06 DIAGNOSIS — Z87.828 S/P ARTHROSCOPIC PARTIAL LATERAL MENISCECTOMY OF RIGHT KNEE: ICD-10-CM

## 2025-01-06 DIAGNOSIS — Z98.890 S/P ARTHROSCOPIC PARTIAL LATERAL MENISCECTOMY OF RIGHT KNEE: ICD-10-CM

## 2025-01-06 DIAGNOSIS — S83.241A OTHER TEAR OF MEDIAL MENISCUS, CURRENT INJURY, RIGHT KNEE, INITIAL ENCOUNTER: ICD-10-CM

## 2025-01-06 DIAGNOSIS — Z87.39 S/P ARTHROSCOPIC RECONSTRUCTION OF ACL OF RIGHT KNEE USING QUADRICEPS TENDON AUTOGRAFT: ICD-10-CM

## 2025-01-06 PROCEDURE — 97112 NEUROMUSCULAR REEDUCATION: CPT

## 2025-01-06 PROCEDURE — 97110 THERAPEUTIC EXERCISES: CPT

## 2025-01-06 NOTE — PROGRESS NOTES
"Daily Note     Today's date: 2025  Patient name: Yasir Avalos  : 1990  MRN: [id number]  Referring provider: Elizabeth Segura,*  Dx:   Encounter Diagnosis     ICD-10-CM    1. Rupture of anterior cruciate ligament of right knee, initial encounter  S83.511A       2. Tear of lateral meniscus of right knee, current, unspecified tear type, initial encounter  S83.281A       3. Other tear of medial meniscus, current injury, right knee, initial encounter  S83.241A       4. S/P arthroscopic partial lateral meniscectomy of right knee  Z98.890     Z87.828       5. S/P arthroscopic partial medial meniscectomy of right knee  Z98.890     Z87.828       6. S/P arthroscopic reconstruction of ACL of right knee using quadriceps tendon autograft  Z98.890     Z87.39           Start Time: 0545  Stop Time: 0630  Total time in clinic (min): 45 minutes    Subjective: ( [id number] utilized throughout session) Patient reports no issues after last session's progressions. Reports no challenges with HEP currently. States pain is well controlled      Objective: See treatment diary below      Assessment: Tolerated treatment well. Patient demonstrated fatigue post treatment, exhibited good technique with therapeutic exercises, and would benefit from continued PT. Session focused on progressing LE strength within tolerability. Continues to be challenged appropriately by current program.  Time spent incorporating walking for time on treadmill to work on gait training and cueing from proper heel strike/knee swing and overall endurance. Plan to continue to progress as symptoms allow.    Plan: Continue per plan of care.      Precautions: S/P ACL Reconstruction 8-weeks      Manuals         Knee PROM                                                    Neuro Re-Ed             Quad Sets             Quad Set + SLR 3x10            TKE Standing EOB 5\"x20 (GTB)            SL Hip ABD           " "  Prone Hip EXT             Step Up + Down (+eccentric focus) 3x5 (8in) 2x10 (8in) 2x10 (8in)          Clinic Walking [facility] 4 Laps 4 Laps           Lateral Step Down   2x10 (4in) 2x10 (6in) 2x10 (6in)        Mini Squats Bar Support Brace On 2x10 (to 90)  Offset STS 2x10 2x10 (BOSU Squat)         SLS Balance 3x30\" 3x30\" (+AIREX)  3x5 (+clock taps) 3x5 (+clock taps on airex) 3x30\" (+ Ball toss) 3x30\" (+ Ball toss)        Lateral Band Walk  4 Laps (GTB) 4 Laps (GTB) 4 Laps (GTB) 4 Laps (GTB)        Monster Walk vs TB   4 Laps (GTB) 4 Laps (GTB) 4 Laps (GTB)        Step Back Lunge             Ther Ex             Rec Bike 6min 10min (lvl 3) 10min (lvl 4) 10min (lvl 5) 10min (lvl 6)        Treadmill Gait Training     5min (3.0)        Heel Slides PROM + Strap             Prone Hang Stretch   5min 5min         Seated HS Stretch             Calf Stretch Supine             Heel Prop LLLD Stretch             Leg Press (B/L) 3x10 (#95) 3x10 (#115) 3x10 (#115) 3x10 (#115)         Leg Press (U/L)  2x10 (#75) 2x10 (#75) 2x10 (#75) 3x10 (#135)                     Ther Activity                                       Gait Training                                       Modalities                                            "

## 2025-01-08 ENCOUNTER — OFFICE VISIT (OUTPATIENT)
Dept: PHYSICAL THERAPY | Facility: REHABILITATION | Age: 35
End: 2025-01-08
Payer: COMMERCIAL

## 2025-01-08 DIAGNOSIS — Z87.828 S/P ARTHROSCOPIC PARTIAL LATERAL MENISCECTOMY OF RIGHT KNEE: ICD-10-CM

## 2025-01-08 DIAGNOSIS — Z98.890 S/P ARTHROSCOPIC PARTIAL MEDIAL MENISCECTOMY OF RIGHT KNEE: ICD-10-CM

## 2025-01-08 DIAGNOSIS — Z98.890 S/P ARTHROSCOPIC PARTIAL LATERAL MENISCECTOMY OF RIGHT KNEE: ICD-10-CM

## 2025-01-08 DIAGNOSIS — S83.241A OTHER TEAR OF MEDIAL MENISCUS, CURRENT INJURY, RIGHT KNEE, INITIAL ENCOUNTER: ICD-10-CM

## 2025-01-08 DIAGNOSIS — Z87.828 S/P ARTHROSCOPIC PARTIAL MEDIAL MENISCECTOMY OF RIGHT KNEE: ICD-10-CM

## 2025-01-08 DIAGNOSIS — S83.511A RUPTURE OF ANTERIOR CRUCIATE LIGAMENT OF RIGHT KNEE, INITIAL ENCOUNTER: Primary | ICD-10-CM

## 2025-01-08 DIAGNOSIS — Z87.39 S/P ARTHROSCOPIC RECONSTRUCTION OF ACL OF RIGHT KNEE USING QUADRICEPS TENDON AUTOGRAFT: ICD-10-CM

## 2025-01-08 DIAGNOSIS — Z98.890 S/P ARTHROSCOPIC RECONSTRUCTION OF ACL OF RIGHT KNEE USING QUADRICEPS TENDON AUTOGRAFT: ICD-10-CM

## 2025-01-08 DIAGNOSIS — S83.281A TEAR OF LATERAL MENISCUS OF RIGHT KNEE, CURRENT, UNSPECIFIED TEAR TYPE, INITIAL ENCOUNTER: ICD-10-CM

## 2025-01-08 PROCEDURE — 97110 THERAPEUTIC EXERCISES: CPT

## 2025-01-08 PROCEDURE — 97112 NEUROMUSCULAR REEDUCATION: CPT

## 2025-01-08 NOTE — PROGRESS NOTES
Daily Note     Today's date: 2025  Patient name: Yasir Avalos  : 1990  MRN: [id number]  Referring provider: Elizabeth Segura,*  Dx:   Encounter Diagnosis     ICD-10-CM    1. Rupture of anterior cruciate ligament of right knee, initial encounter  S83.511A       2. Tear of lateral meniscus of right knee, current, unspecified tear type, initial encounter  S83.281A       3. Other tear of medial meniscus, current injury, right knee, initial encounter  S83.241A       4. S/P arthroscopic partial lateral meniscectomy of right knee  Z98.890     Z87.828       5. S/P arthroscopic partial medial meniscectomy of right knee  Z98.890     Z87.828       6. S/P arthroscopic reconstruction of ACL of right knee using quadriceps tendon autograft  Z98.890     Z87.39           Start Time: 0545  Stop Time: 0630  Total time in clinic (min): 45 minutes    Subjective: ( [id number] utilized throughout session) Patient reports no issues after last session's progressions. Has been working on walking more at home as instructed      Objective: See treatment diary below      Assessment: Tolerated treatment well. Patient demonstrated fatigue post treatment, exhibited good technique with therapeutic exercises, and would benefit from continued PT. Session focused on progressing LE strength within tolerability. Continues to be challenged appropriately by current program.  Gait mechanics continue to improve and appear more normalized today on treadmill and was capable of increasing speed without breakdown of mechanics. Progressed lateral step down to 8 inch with increased challenge but able to perform without pain or instability, though has tendency to laterally flex trunk to R with fatigue.    Plan: Continue per plan of care.      Precautions: S/P ACL Reconstruction 8-weeks      Manuals        Knee PROM                                                    Neuro Re-Ed             Quad  "Sets             Quad Set + SLR 3x10            TKE Standing EOB 5\"x20 (GTB)            SL Hip ABD             Prone Hip EXT             Step Up + Down (+eccentric focus) 3x5 (8in) 2x10 (8in) 2x10 (8in)          Clinic Walking [facility] 4 Laps 4 Laps           Lateral Step Down   2x10 (4in) 2x10 (6in) 2x10 (6in) 3x10 (8in)       Mini Squats Bar Support Brace On 2x10 (to 90)  Offset STS 2x10 2x10 (BOSU Squat)         SLS Balance 3x30\" 3x30\" (+AIREX)  3x5 (+clock taps) 3x5 (+clock taps on airex) 3x30\" (+ Ball toss) 3x30\" (+ Ball toss) 3x30\" (+ Ball toss) (+blue foam disk)       Lateral Band Walk  4 Laps (GTB) 4 Laps (GTB) 4 Laps (GTB) 4 Laps (GTB) 4 Laps (BTB)       Monster Walk vs TB   4 Laps (GTB) 4 Laps (GTB) 4 Laps (GTB) 4 Laps (BTB)       Step Back Lunge             Ther Ex             Rec Bike 6min 10min (lvl 3) 10min (lvl 4) 10min (lvl 5) 10min (lvl 6) 10min (lvl 6)       Treadmill Gait Training     5min (3.0) 8min (3.5)       Heel Slides PROM + Strap             Prone Hang Stretch   5min 5min         Seated HS Stretch             Calf Stretch Supine             Heel Prop LLLD Stretch             Leg Press (B/L) 3x10 (#95) 3x10 (#115) 3x10 (#115) 3x10 (#115)         Leg Press (U/L)  2x10 (#75) 2x10 (#75) 2x10 (#75) 3x10 (#135) 3x10 (#135)                    Ther Activity                                       Gait Training                                       Modalities                                            "

## 2025-01-13 ENCOUNTER — OFFICE VISIT (OUTPATIENT)
Dept: PHYSICAL THERAPY | Facility: REHABILITATION | Age: 35
End: 2025-01-13
Payer: COMMERCIAL

## 2025-01-13 DIAGNOSIS — Z87.39 S/P ARTHROSCOPIC RECONSTRUCTION OF ACL OF RIGHT KNEE USING QUADRICEPS TENDON AUTOGRAFT: ICD-10-CM

## 2025-01-13 DIAGNOSIS — S83.511A RUPTURE OF ANTERIOR CRUCIATE LIGAMENT OF RIGHT KNEE, INITIAL ENCOUNTER: Primary | ICD-10-CM

## 2025-01-13 DIAGNOSIS — S83.281A TEAR OF LATERAL MENISCUS OF RIGHT KNEE, CURRENT, UNSPECIFIED TEAR TYPE, INITIAL ENCOUNTER: ICD-10-CM

## 2025-01-13 DIAGNOSIS — Z98.890 S/P ARTHROSCOPIC PARTIAL MEDIAL MENISCECTOMY OF RIGHT KNEE: ICD-10-CM

## 2025-01-13 DIAGNOSIS — Z87.828 S/P ARTHROSCOPIC PARTIAL LATERAL MENISCECTOMY OF RIGHT KNEE: ICD-10-CM

## 2025-01-13 DIAGNOSIS — Z98.890 S/P ARTHROSCOPIC RECONSTRUCTION OF ACL OF RIGHT KNEE USING QUADRICEPS TENDON AUTOGRAFT: ICD-10-CM

## 2025-01-13 DIAGNOSIS — Z98.890 S/P ARTHROSCOPIC PARTIAL LATERAL MENISCECTOMY OF RIGHT KNEE: ICD-10-CM

## 2025-01-13 DIAGNOSIS — Z87.828 S/P ARTHROSCOPIC PARTIAL MEDIAL MENISCECTOMY OF RIGHT KNEE: ICD-10-CM

## 2025-01-13 DIAGNOSIS — S83.241A OTHER TEAR OF MEDIAL MENISCUS, CURRENT INJURY, RIGHT KNEE, INITIAL ENCOUNTER: ICD-10-CM

## 2025-01-13 PROCEDURE — 97112 NEUROMUSCULAR REEDUCATION: CPT

## 2025-01-13 PROCEDURE — 97110 THERAPEUTIC EXERCISES: CPT

## 2025-01-13 NOTE — PROGRESS NOTES
Daily Note     Today's date: 2025  Patient name: Yasir Avalos  : 1990  MRN: [id number]  Referring provider: Elizabeth Segura,*  Dx:   Encounter Diagnosis     ICD-10-CM    1. Rupture of anterior cruciate ligament of right knee, initial encounter  S83.511A       2. Tear of lateral meniscus of right knee, current, unspecified tear type, initial encounter  S83.281A       3. Other tear of medial meniscus, current injury, right knee, initial encounter  S83.241A       4. S/P arthroscopic partial lateral meniscectomy of right knee  Z98.890     Z87.828       5. S/P arthroscopic partial medial meniscectomy of right knee  Z98.890     Z87.828       6. S/P arthroscopic reconstruction of ACL of right knee using quadriceps tendon autograft  Z98.890     Z87.39           Start Time: 0545  Stop Time: 0630  Total time in clinic (min): 45 minutes    Subjective: ( [id number] utilized throughout session) Patient reports no issues after last session's progressions. States walking is better but doesn't feel normal yet, as the knee still feels a bit swollen.    Objective: See treatment diary below      Assessment: Tolerated treatment well. Patient demonstrated fatigue post treatment, exhibited good technique with therapeutic exercises, and would benefit from continued PT. Session with continued focused on progressing LE strength within tolerability. Continues to be challenged appropriately by current program but gait mechanics more normalized today with treadmill walking where stance time on R LE and L step length are slightly improved.  Continues with slight difficulty with fatigue with slight breakdown in mechanics but improved from last session.    Plan: Continue per plan of care.      Precautions: S/P ACL Reconstruction 8-weeks      Manuals       Knee PROM                                                    Neuro Re-Ed             Quad Sets             Quad Set +  "SLR 3x10            TKE Standing EOB 5\"x20 (GTB)            SL Hip ABD             Prone Hip EXT             Step Up + Down (+eccentric focus) 3x5 (8in) 2x10 (8in) 2x10 (8in)          Clinic Walking [facility] 4 Laps 4 Laps           Lateral Step Down   2x10 (4in) 2x10 (6in) 2x10 (6in) 3x10 (8in) 3x10 (8in)      Mini Squats Bar Support Brace On 2x10 (to 90)  Offset STS 2x10 2x10 (BOSU Squat)   2x10 (BOSU Squat)      SLS Balance 3x30\" 3x30\" (+AIREX)  3x5 (+clock taps) 3x5 (+clock taps on airex) 3x30\" (+ Ball toss) 3x30\" (+ Ball toss) 3x30\" (+ Ball toss) (+blue foam disk) 3x30\" (+ Ball toss) (+blue foam disk)      Lateral Band Walk  4 Laps (GTB) 4 Laps (GTB) 4 Laps (GTB) 4 Laps (GTB) 4 Laps (BTB)       Monster Walk vs TB   4 Laps (GTB) 4 Laps (GTB) 4 Laps (GTB) 4 Laps (BTB)       Step Back Lunge (fwd/lateral)       2x10ea      Ther Ex             Rec Bike 6min 10min (lvl 3) 10min (lvl 4) 10min (lvl 5) 10min (lvl 6) 10min (lvl 6) 10min (lvl 7)      Treadmill Gait Training     5min (3.0) 8min (3.5) 10min (3.5)      Heel Slides PROM + Strap             Prone Hang Stretch   5min 5min         Seated HS Stretch             Calf Stretch Supine             Heel Prop LLLD Stretch             Leg Press (B/L) 3x10 (#95) 3x10 (#115) 3x10 (#115) 3x10 (#115)         Leg Press (U/L)  2x10 (#75) 2x10 (#75) 2x10 (#75) 3x10 (#135) 3x10 (#135) 3x10 (#135)                   Ther Activity                                       Gait Training                                       Modalities                                            "

## 2025-01-15 ENCOUNTER — OFFICE VISIT (OUTPATIENT)
Dept: PHYSICAL THERAPY | Facility: REHABILITATION | Age: 35
End: 2025-01-15
Payer: COMMERCIAL

## 2025-01-15 DIAGNOSIS — Z98.890 S/P ARTHROSCOPIC PARTIAL MEDIAL MENISCECTOMY OF RIGHT KNEE: ICD-10-CM

## 2025-01-15 DIAGNOSIS — Z98.890 S/P ARTHROSCOPIC PARTIAL LATERAL MENISCECTOMY OF RIGHT KNEE: ICD-10-CM

## 2025-01-15 DIAGNOSIS — Z87.39 S/P ARTHROSCOPIC RECONSTRUCTION OF ACL OF RIGHT KNEE USING QUADRICEPS TENDON AUTOGRAFT: Primary | ICD-10-CM

## 2025-01-15 DIAGNOSIS — Z87.828 S/P ARTHROSCOPIC PARTIAL MEDIAL MENISCECTOMY OF RIGHT KNEE: ICD-10-CM

## 2025-01-15 DIAGNOSIS — S83.241A OTHER TEAR OF MEDIAL MENISCUS, CURRENT INJURY, RIGHT KNEE, INITIAL ENCOUNTER: ICD-10-CM

## 2025-01-15 DIAGNOSIS — Z87.828 S/P ARTHROSCOPIC PARTIAL LATERAL MENISCECTOMY OF RIGHT KNEE: ICD-10-CM

## 2025-01-15 DIAGNOSIS — S83.511A RUPTURE OF ANTERIOR CRUCIATE LIGAMENT OF RIGHT KNEE, INITIAL ENCOUNTER: ICD-10-CM

## 2025-01-15 DIAGNOSIS — S83.281A TEAR OF LATERAL MENISCUS OF RIGHT KNEE, CURRENT, UNSPECIFIED TEAR TYPE, INITIAL ENCOUNTER: ICD-10-CM

## 2025-01-15 DIAGNOSIS — Z98.890 S/P ARTHROSCOPIC RECONSTRUCTION OF ACL OF RIGHT KNEE USING QUADRICEPS TENDON AUTOGRAFT: Primary | ICD-10-CM

## 2025-01-15 PROCEDURE — 97112 NEUROMUSCULAR REEDUCATION: CPT

## 2025-01-15 PROCEDURE — 97110 THERAPEUTIC EXERCISES: CPT

## 2025-01-15 NOTE — PROGRESS NOTES
Daily Note     Today's date: 1/15/2025  Patient name: Yasir Avalos  : 1990  MRN: [id number]  Referring provider: Elizabeth Segura,*  Dx:   Encounter Diagnosis     ICD-10-CM    1. S/P arthroscopic reconstruction of ACL of right knee using quadriceps tendon autograft  Z98.890     Z87.39       2. Rupture of anterior cruciate ligament of right knee, initial encounter  S83.511A       3. Tear of lateral meniscus of right knee, current, unspecified tear type, initial encounter  S83.281A       4. Other tear of medial meniscus, current injury, right knee, initial encounter  S83.241A       5. S/P arthroscopic partial lateral meniscectomy of right knee  Z98.890     Z87.828       6. S/P arthroscopic partial medial meniscectomy of right knee  Z98.890     Z87.828           Start Time: 0545  Stop Time: 0630  Total time in clinic (min): 45 minutes    Subjective: ( [id number] utilized throughout session) Patient reports no issues after last session's progressions. States walking is better but doesn't feel normal yet, as the knee still feels a bit swollen.    Objective: See treatment diary below      Assessment: Tolerated treatment well. Patient demonstrated fatigue post treatment, exhibited good technique with therapeutic exercises, and would benefit from continued PT. Session with continued focused on progressing LE strength within tolerability. Gait mechanics more normalized with treadmill walking and patient able to increase pace without breakdown. Continues to be challenged by lunges but appropriate stability in the knee. Time spent incorporating calf specific strengthening and encouraged patient to perform calf raises at home. Improving endurance with lateral step down without as much fatigue between sets.  Plan to re-assess at time of NV.    Plan: Continue per plan of care.      Precautions: S/P ACL Reconstruction 8-weeks      Manuals 12/23 12/26 12/30 1/2 1/6 1/8 1/13 1/15     Knee PROM      "                                               Neuro Re-Ed             Quad Sets             Quad Set + SLR 3x10            TKE Standing EOB 5\"x20 (GTB)            SL Hip ABD             Prone Hip EXT             Step Up + Down (+eccentric focus) 3x5 (8in) 2x10 (8in) 2x10 (8in)          Clinic Walking [facility] 4 Laps 4 Laps           Lateral Step Down   2x10 (4in) 2x10 (6in) 2x10 (6in) 3x10 (8in) 3x10 (8in) 3x10 (8in)     Mini Squats Bar Support Brace On 2x10 (to 90)  Offset STS 2x10 2x10 (BOSU Squat)   2x10 (BOSU Squat)      SLS Balance 3x30\" 3x30\" (+AIREX)  3x5 (+clock taps) 3x5 (+clock taps on airex) 3x30\" (+ Ball toss) 3x30\" (+ Ball toss) 3x30\" (+ Ball toss) (+blue foam disk) 3x30\" (+ Ball toss) (+blue foam disk)      Lateral Band Walk  4 Laps (GTB) 4 Laps (GTB) 4 Laps (GTB) 4 Laps (GTB) 4 Laps (BTB)       Monster Walk vs TB   4 Laps (GTB) 4 Laps (GTB) 4 Laps (GTB) 4 Laps (BTB)       Step Back Lunge (fwd/lateral)       2x10ea 2x10ea     Ther Ex             Rec Bike 6min 10min (lvl 3) 10min (lvl 4) 10min (lvl 5) 10min (lvl 6) 10min (lvl 6) 10min (lvl 7) 6min (lvl 10)     Treadmill Gait Training     5min (3.0) 8min (3.5) 10min (3.5) 10min (4.0)     Heel Slides PROM + Strap             Prone Hang Stretch   5min 5min         Seated HS Stretch             Calf Stretch Supine             Heel Prop LLLD Stretch             Leg Press (B/L) 3x10 (#95) 3x10 (#115) 3x10 (#115) 3x10 (#115)         Leg Press (U/L)  2x10 (#75) 2x10 (#75) 2x10 (#75) 3x10 (#135) 3x10 (#135) 3x10 (#135) 3x10 (#135)     Calf Raises U/L        2x10     Ther Activity                                       Gait Training                                       Modalities                                            "

## 2025-01-20 ENCOUNTER — EVALUATION (OUTPATIENT)
Dept: PHYSICAL THERAPY | Facility: REHABILITATION | Age: 35
End: 2025-01-20
Payer: COMMERCIAL

## 2025-01-20 DIAGNOSIS — Z87.828 S/P ARTHROSCOPIC PARTIAL MEDIAL MENISCECTOMY OF RIGHT KNEE: ICD-10-CM

## 2025-01-20 DIAGNOSIS — S83.281A TEAR OF LATERAL MENISCUS OF RIGHT KNEE, CURRENT, UNSPECIFIED TEAR TYPE, INITIAL ENCOUNTER: ICD-10-CM

## 2025-01-20 DIAGNOSIS — Z98.890 S/P ARTHROSCOPIC PARTIAL MEDIAL MENISCECTOMY OF RIGHT KNEE: ICD-10-CM

## 2025-01-20 DIAGNOSIS — Z87.828 S/P ARTHROSCOPIC PARTIAL LATERAL MENISCECTOMY OF RIGHT KNEE: ICD-10-CM

## 2025-01-20 DIAGNOSIS — S83.511A RUPTURE OF ANTERIOR CRUCIATE LIGAMENT OF RIGHT KNEE, INITIAL ENCOUNTER: ICD-10-CM

## 2025-01-20 DIAGNOSIS — Z98.890 S/P ARTHROSCOPIC RECONSTRUCTION OF ACL OF RIGHT KNEE USING QUADRICEPS TENDON AUTOGRAFT: Primary | ICD-10-CM

## 2025-01-20 DIAGNOSIS — Z87.39 S/P ARTHROSCOPIC RECONSTRUCTION OF ACL OF RIGHT KNEE USING QUADRICEPS TENDON AUTOGRAFT: Primary | ICD-10-CM

## 2025-01-20 DIAGNOSIS — Z98.890 S/P ARTHROSCOPIC PARTIAL LATERAL MENISCECTOMY OF RIGHT KNEE: ICD-10-CM

## 2025-01-20 DIAGNOSIS — S83.241A OTHER TEAR OF MEDIAL MENISCUS, CURRENT INJURY, RIGHT KNEE, INITIAL ENCOUNTER: ICD-10-CM

## 2025-01-20 PROCEDURE — 97112 NEUROMUSCULAR REEDUCATION: CPT

## 2025-01-20 PROCEDURE — 97110 THERAPEUTIC EXERCISES: CPT

## 2025-01-20 NOTE — PROGRESS NOTES
PT Re-Evaluation     Today's date: 2025  Patient name: Yasir Avalos  : 1990  MRN: [id number]  Referring provider: Elizabeth Segura,*  Dx:   Encounter Diagnosis     ICD-10-CM    1. S/P arthroscopic reconstruction of ACL of right knee using quadriceps tendon autograft  Z98.890     Z87.39       2. Rupture of anterior cruciate ligament of right knee, initial encounter  S83.511A       3. Tear of lateral meniscus of right knee, current, unspecified tear type, initial encounter  S83.281A       4. Other tear of medial meniscus, current injury, right knee, initial encounter  S83.241A       5. S/P arthroscopic partial lateral meniscectomy of right knee  Z98.890     Z87.828       6. S/P arthroscopic partial medial meniscectomy of right knee  Z98.890     Z87.828           Start Time: 0545  Stop Time: 0630  Total time in clinic (min): 45 minutes    Assessment  Impairments: abnormal gait, abnormal muscle tone, abnormal or restricted ROM, activity intolerance, impaired balance, impaired physical strength, lacks appropriate home exercise program, pain with function, weight-bearing intolerance, poor posture  and poor body mechanics  Functional limitations: prolonged standing/walking, squating, lifting, stair navigation  Symptom irritability: moderate    Assessment details: Patient is a 34 y.o. male presenting to re-examination with chief complaint of R knee pain and stiffness XXX-weeks S/P arthroscopic ACL reconstruction w/ quad autograft + medial/lateral meniscectomy. Patient exhibits favorable progress toward objective and functional goals at time of reexamination. Patient exhibits improvements with knee ROM, quad activation/knee strength, hip strength, balance and improved gait since last assessment however continues to have limitations compared to prior level of function. Remaining functional limitations include those listed below. As a result of impairments patient has continued limitations  with daily and functional activities and would benefit from continued skilled PT interventions to address these impairments in order to maximize function.  Understanding of Dx/Px/POC: good     Prognosis: good    Goals  Impairment Goals: 4-6 weeks  - Patient to decrease pain to 1-2/10 (MET)  - Patient to improve knee AROM to equal to uninvolved side (PARTIALLY MET)  - Patient to increase knee strength to 5/5 throughout (PARTIALLY MET)  - Patient to increase hip strength to 5/5 throughout (PARTIALLY MET)  - Patient to be capable of leg pressing U/L BW for same reps as opposite leg (NEW)    Functional Goals: by discharge  - Patient to discharge to independent HEP (PARTIALLY MET)  - Patient to improve subjective functional level to 85% (PARTIALLY MET)  - Patient to ambulate in home and community without increased pain or difficulty (PARTIALLY MET)  - Patient to ascend and descend stairs without increased pain or difficulty (PARTIALLY MET)  - Patient to complete sit to stand transfers without increased pain or difficulty (PARTIALLY MET)      Plan  Patient would benefit from: skilled physical therapy  Referral necessary: No    Planned therapy interventions: manual therapy, joint mobilization, nerve gliding, neuromuscular re-education, patient/caregiver education, postural training, strengthening, stretching, therapeutic activities, therapeutic exercise, home exercise program, gait training, functional ROM exercises, behavior modification, body mechanics training, balance, abdominal trunk stabilization and activity modification    Frequency: 2x week  Duration in weeks: 6  Plan of Care beginning date: 1/20/2025  Plan of Care expiration date: 3/7/2025  Treatment plan discussed with: patient      Subjective Evaluation    History of Present Illness  Date of surgery: 11/8/2024  Mechanism of injury: surgery  Mechanism of injury:  [id number] utilized throughout encounter:    Reports further improvement since last  assessment    Walking is more normal    Stair navigation is improved as well and no longer causes pain.     Feels stronger overall    Describes no issues with HEP though notes some knee pain with increased duration with the stretch into EXT discussed last session but resolves after stretch              Not a recurrent problem   Quality of life: good    Patient Goals  Patient goals for therapy: increased strength, independence with ADLs/IADLs, return to sport/leisure activities, increased motion, improved balance, decreased pain and decreased edema    Pain  Current pain ratin  At best pain ratin  At worst pain ratin  Quality: tight, sharp and dull ache  Relieving factors: ice  Aggravating factors: lifting, stair climbing, walking, standing and sitting  Progression: improved    Treatments  No previous or current treatments      Objective     Observations     Right Knee   Positive for edema and incision.     Additional Observation Details  Incision appears to be well maintained, no obvious signs of infection, normal post surgical swelling/heat/redness        Palpation     Right   No palpable tenderness to the rectus femoris, vastus lateralis and vastus medialis.   Hypertonic in the distal biceps femoris, distal semimembranosus, distal semitendinosus, lateral gastrocnemius and medial gastrocnemius.     Tenderness     Right Knee   No tenderness in the lateral joint line, lateral patella, medial joint line, medial patella, patellar tendon, popliteal fossa and quadriceps tendon.     Active Range of Motion   Left Knee   Flexion: 135 degrees WFL  Extension: 2 degrees WFL    Right Knee   Flexion: 130 degrees   Extension: 1 degrees     Additional Active Range of Motion Details  Capable of 130 degrees flexion and 1 degree hyperextension following today's stretches    Strength/Myotome Testing     Left Hip   Planes of Motion   Flexion: 5  Extension: 5  Abduction: 5    Right Hip   Planes of Motion   Flexion:  "5  Extension: 4+  Abduction: 4+    Left Knee   Flexion: 5  Extension: 5  Quadriceps contraction: good    Right Knee   Flexion: 5  Extension: 5  Quadriceps contraction: good    Additional Strength Details  Quad lag resolved R LE    Leg Press Quad Strength Test: Body weight 30 reps B/L but reducing eccentric control with fatigue     Swelling     Left Knee Girth Measurement (cm)   Joint line: 36 cm    Right Knee Girth Measurement (cm)   Joint line: 39 cm    Functional Assessment      Squat  No trunk lean left, no left valgus, no left tibial anterior translation beyond toes, no right valgus and no right tibial anterior translation beyond toes.     Comments  Capable of mini squat past 90 degrees with no pain and equal WB    SLS Balance: R LE   Trial 1: 30sec  Trial 2: 30sec  Trial 3: 30sec    L LE (30 sec sustained)    Lateral Step down assessment:  Capable of performing x10 reps B/L with good from and stability but notable difference in fatigue between R /L and slight medial tracking towards end reps on R LE.  Corrects with verbal cueing but weakness persists             Precautions: S/P ACL Reconstruction 6-weeks      Manuals 12/23 12/26 12/30 1/2 1/6 1/8 1/13 1/15 1/20    Knee PROM                                                    Neuro Re-Ed             Quad Sets             Quad Set + SLR 3x10            TKE Standing EOB 5\"x20 (GTB)            SL Hip ABD             Prone Hip EXT             Step Up + Down (+eccentric focus) 3x5 (8in) 2x10 (8in) 2x10 (8in)          Clinic Walking [facility] 4 Laps 4 Laps           Lateral Step Down   2x10 (4in) 2x10 (6in) 2x10 (6in) 3x10 (8in) 3x10 (8in) 3x10 (8in)     Mini Squats Bar Support Brace On 2x10 (to 90)  Offset STS 2x10 2x10 (BOSU Squat)   2x10 (BOSU Squat)      SLS Balance 3x30\" 3x30\" (+AIREX)  3x5 (+clock taps) 3x5 (+clock taps on airex) 3x30\" (+ Ball toss) 3x30\" (+ Ball toss) 3x30\" (+ Ball toss) (+blue foam disk) 3x30\" (+ Ball toss) (+blue foam disk)      Lateral Band " Walk  4 Laps (GTB) 4 Laps (GTB) 4 Laps (GTB) 4 Laps (GTB) 4 Laps (BTB)       Monster Walk vs TB   4 Laps (GTB) 4 Laps (GTB) 4 Laps (GTB) 4 Laps (BTB)       Step Back Lunge (fwd/lateral)       2x10ea 2x10ea     Ther Ex             Rec Bike 6min 10min (lvl 3) 10min (lvl 4) 10min (lvl 5) 10min (lvl 6) 10min (lvl 6) 10min (lvl 7) 6min (lvl 10)     Treadmill Gait Training     5min (3.0) 8min (3.5) 10min (3.5) 10min (4.0) 10min (4.0)    Heel Slides PROM + Strap             Prone Hang Stretch   5min 5min         Seated HS Stretch             Calf Stretch Supine             Heel Prop LLLD Stretch             Leg Press (B/L) 3x10 (#95) 3x10 (#115) 3x10 (#115) 3x10 (#115)         Leg Press (U/L)  2x10 (#75) 2x10 (#75) 2x10 (#75) 3x10 (#135) 3x10 (#135) 3x10 (#135) 3x10 (#135)     Calf Raises U/L        2x10     Ther Activity                                       Gait Training                                       Modalities

## 2025-01-21 ENCOUNTER — OFFICE VISIT (OUTPATIENT)
Dept: OBGYN CLINIC | Facility: MEDICAL CENTER | Age: 35
End: 2025-01-21

## 2025-01-21 VITALS — BODY MASS INDEX: 26.68 KG/M2 | HEIGHT: 66 IN | WEIGHT: 166 LBS

## 2025-01-21 DIAGNOSIS — S83.511A RUPTURE OF ANTERIOR CRUCIATE LIGAMENT OF RIGHT KNEE, INITIAL ENCOUNTER: Primary | ICD-10-CM

## 2025-01-21 PROCEDURE — 99024 POSTOP FOLLOW-UP VISIT: CPT | Performed by: ORTHOPAEDIC SURGERY

## 2025-01-21 NOTE — PROGRESS NOTES
"Knee Post Operative Visit     Assesment:     34 y.o. male 10 weeks s/p surgical arthroscopy of the right knee with ACL reconstruction with quadriceps autograft with partial medial and lateral menisectomies, DOS: 11/08/2024.    Plan:    Continue PT per protocol. Encouraged patient to continue to work on his knee ROM        Chief Complaint   Patient presents with    Right Knee - Follow-up       History of Present Illness:    The patient is a 34 y.o. male who is being evaluated post operatively 10  weeks s/p surgical arthroscopy of the right knee with ACL reconstruction with quadriceps autograft with partial medial and lateral menisectomies, DOS: 11/08/2024. Patient presents today with a friend who provided Bulgarian translation throughout today's visit.    Since the prior visit, He reports  improvement.  He states that his pain is well-controlled at this time.  He denies use of medication for pain. He is using ice to control swelling    He has no instability of the knee.  No significant pain.  No other complaints.  No new injuries.         I have reviewed the past medical, surgical, social and family history, medications and allergies as documented in the EMR.    Review of systems: ROS is negative other than that noted in the HPI.  Constitutional: Negative for fatigue and fever.        Physical Exam:    Height 5' 6\" (1.676 m), weight 75.3 kg (166 lb).    General/Constitutional: NAD, well developed, well nourished  HENT: Normocephalic, atraumatic  CV: Intact distal pulses, regular rate  Resp: No respiratory distress or labored breathing  Lymphatic: No lymphadenopathy palpated  Neuro: Alert and Oriented x 3, no focal deficits  Psych: Normal mood, normal affect, normal judgement, normal behavior  Skin: Warm, dry, no rashes, no erythema      Knee Exam (focused):                  RIGHT LEFT   ROM:   0-120 0-130   Palpation: Effusion mild negative     MJL tenderness Positive minimal Negative     LJL tenderness Negative " Negative   Instability: Varus stable stable     Valgus stable stable   Special Tests: Lachman Negative Negative     Posterior drawer Negative Negative     Anterior drawer Negative Negative     Pivot shift not tested not tested     Dial not tested not tested   Patella: Palpation no tenderness no tenderness     Mobility 1/4 1/4     Apprehension Negative Negative   Other: Single leg 1/4 squat not tested    Expected quad atrophy not tested      Incisions show no erythema, no drainage    LE NV Exam: +2 DP/PT pulses bilaterally  Sensation intact to light touch L2-S1 bilaterally     Bilateral hip ROM demonstrates no pain actively or passively    No calf tenderness to palpation bilaterally      Scribe Attestation      I,:   am acting as a scribe while in the presence of the attending physician.:       I,:   personally performed the services described in this documentation    as scribed in my presence.:

## 2025-01-22 ENCOUNTER — OFFICE VISIT (OUTPATIENT)
Dept: PHYSICAL THERAPY | Facility: REHABILITATION | Age: 35
End: 2025-01-22
Payer: COMMERCIAL

## 2025-01-22 DIAGNOSIS — Z98.890 S/P ARTHROSCOPIC PARTIAL MEDIAL MENISCECTOMY OF RIGHT KNEE: ICD-10-CM

## 2025-01-22 DIAGNOSIS — Z87.828 S/P ARTHROSCOPIC PARTIAL LATERAL MENISCECTOMY OF RIGHT KNEE: ICD-10-CM

## 2025-01-22 DIAGNOSIS — S83.241A OTHER TEAR OF MEDIAL MENISCUS, CURRENT INJURY, RIGHT KNEE, INITIAL ENCOUNTER: ICD-10-CM

## 2025-01-22 DIAGNOSIS — Z98.890 S/P ARTHROSCOPIC PARTIAL LATERAL MENISCECTOMY OF RIGHT KNEE: ICD-10-CM

## 2025-01-22 DIAGNOSIS — Z98.890 S/P ARTHROSCOPIC RECONSTRUCTION OF ACL OF RIGHT KNEE USING QUADRICEPS TENDON AUTOGRAFT: Primary | ICD-10-CM

## 2025-01-22 DIAGNOSIS — Z87.828 S/P ARTHROSCOPIC PARTIAL MEDIAL MENISCECTOMY OF RIGHT KNEE: ICD-10-CM

## 2025-01-22 DIAGNOSIS — S83.511A RUPTURE OF ANTERIOR CRUCIATE LIGAMENT OF RIGHT KNEE, INITIAL ENCOUNTER: ICD-10-CM

## 2025-01-22 DIAGNOSIS — Z87.39 S/P ARTHROSCOPIC RECONSTRUCTION OF ACL OF RIGHT KNEE USING QUADRICEPS TENDON AUTOGRAFT: Primary | ICD-10-CM

## 2025-01-22 DIAGNOSIS — S83.281A TEAR OF LATERAL MENISCUS OF RIGHT KNEE, CURRENT, UNSPECIFIED TEAR TYPE, INITIAL ENCOUNTER: ICD-10-CM

## 2025-01-22 PROCEDURE — 97112 NEUROMUSCULAR REEDUCATION: CPT

## 2025-01-22 PROCEDURE — 97110 THERAPEUTIC EXERCISES: CPT

## 2025-01-22 NOTE — PROGRESS NOTES
Daily Note     Today's date: 2025  Patient name: Yasir Avalos  : 1990  MRN: [id number]  Referring provider: Elizabeth Segura,*  Dx:   Encounter Diagnosis     ICD-10-CM    1. S/P arthroscopic reconstruction of ACL of right knee using quadriceps tendon autograft  Z98.890     Z87.39       2. Rupture of anterior cruciate ligament of right knee, initial encounter  S83.511A       3. Tear of lateral meniscus of right knee, current, unspecified tear type, initial encounter  S83.281A       4. Other tear of medial meniscus, current injury, right knee, initial encounter  S83.241A       5. S/P arthroscopic partial lateral meniscectomy of right knee  Z98.890     Z87.828       6. S/P arthroscopic partial medial meniscectomy of right knee  Z98.890     Z87.828           Start Time: 0545  Stop Time: 0630  Total time in clinic (min): 45 minutes    Subjective: ( [id number] utilized throughout session) Patient reports things went well at ortho follow-up and that things are looking better. Notes no issues following last session.      Objective: See treatment diary below      Assessment: Tolerated treatment well. Patient demonstrated fatigue post treatment, exhibited good technique with therapeutic exercises, and would benefit from continued PT. Improved knee stability on lateral step down and appropriate stability with SLS balance clock taps. Introduced box jumps to 8 inch step for reduced GRF with good symmetry with jump and landing and instructed patient to initiate in HEP on first step at home with focus on soft landing and symmetry. Instructed to continue focusing on knee ROM in addition to the lateral step down/balance/step hops at home and will initiate plyometrics at time of NV      Plan: Continue per plan of care.      Precautions: S/P ACL Reconstruction 6-weeks      Manuals 12/23 12/26 12/30 1/2 1/6 1/8 1/13 1/15 1/20 1/22   Knee PROM                                                   "  Neuro Re-Ed         RE-EVAL    Quad Sets             Quad Set + SLR 3x10            TKE Standing EOB 5\"x20 (GTB)            SL Hip ABD             Prone Hip EXT             Step Up + Down (+eccentric focus) 3x5 (8in) 2x10 (8in) 2x10 (8in)          Clinic Walking [facility] 4 Laps 4 Laps           Lateral Step Down   2x10 (4in) 2x10 (6in) 2x10 (6in) 3x10 (8in) 3x10 (8in) 3x10 (8in)  3x10 (8in)   Mini Squats Bar Support Brace On 2x10 (to 90)  Offset STS 2x10 2x10 (BOSU Squat)   2x10 (BOSU Squat)      SLS Balance 3x30\" 3x30\" (+AIREX)  3x5 (+clock taps) 3x5 (+clock taps on airex) 3x30\" (+ Ball toss) 3x30\" (+ Ball toss) 3x30\" (+ Ball toss) (+blue foam disk) 3x30\" (+ Ball toss) (+blue foam disk)   3x5 (+clock taps on airex)   Lateral Band Walk  4 Laps (GTB) 4 Laps (GTB) 4 Laps (GTB) 4 Laps (GTB) 4 Laps (BTB)       Monster Walk vs TB   4 Laps (GTB) 4 Laps (GTB) 4 Laps (GTB) 4 Laps (BTB)       Step Back Lunge (fwd/lateral)       2x10ea 2x10ea     Box Hops to Step          2x10 (8in)   Ther Ex             Rec Bike 6min 10min (lvl 3) 10min (lvl 4) 10min (lvl 5) 10min (lvl 6) 10min (lvl 6) 10min (lvl 7) 6min (lvl 10)  6min (lvl10)   Treadmill Gait Training     5min (3.0) 8min (3.5) 10min (3.5) 10min (4.0) 10min (4.0) 10min (4.0)   Heel Slides PROM + Strap             Prone Hang Stretch   5min 5min         Seated HS Stretch             Calf Stretch Supine             Heel Prop LLLD Stretch             Leg Press (B/L) 3x10 (#95) 3x10 (#115) 3x10 (#115) 3x10 (#115)         Leg Press (U/L)  2x10 (#75) 2x10 (#75) 2x10 (#75) 3x10 (#135) 3x10 (#135) 3x10 (#135) 3x10 (#135)  3x10 (#155)   Calf Raises U/L        2x10     Ther Activity                                       Gait Training                                       Modalities                                            "

## 2025-01-27 ENCOUNTER — OFFICE VISIT (OUTPATIENT)
Dept: PHYSICAL THERAPY | Facility: REHABILITATION | Age: 35
End: 2025-01-27
Payer: COMMERCIAL

## 2025-01-27 DIAGNOSIS — Z98.890 S/P ARTHROSCOPIC RECONSTRUCTION OF ACL OF RIGHT KNEE USING QUADRICEPS TENDON AUTOGRAFT: Primary | ICD-10-CM

## 2025-01-27 DIAGNOSIS — Z87.39 S/P ARTHROSCOPIC RECONSTRUCTION OF ACL OF RIGHT KNEE USING QUADRICEPS TENDON AUTOGRAFT: Primary | ICD-10-CM

## 2025-01-27 NOTE — PROGRESS NOTES
"Daily Note     Today's date: 2025  Patient name: Yasir Avalos  : 1990  MRN: [id number]  Referring provider: Elizabeth Segura,*  Dx:   Encounter Diagnosis     ICD-10-CM    1. S/P arthroscopic reconstruction of ACL of right knee using quadriceps tendon autograft  Z98.890     Z87.39         (): No charge session as time was spent discussing financial aid and coverage as described below.                Subjective: ( [id number] utilized throughout session) Patient reports that he felt okay after last session with the addition of step jump.  Discussed with patient financial aid and responsibility for sessions moving forward as his financial aid has yet to be approved and patient states that he would be comfortable putting things on hold briefly while he follows up with financial services and will reach out to clinic upon determining status.       Objective: See treatment diary below      Assessment: No charge session, consisted of discussion as described as above      Plan: Continue per plan of care.      Precautions: S/P ACL Reconstruction 6-weeks      Manuals 12/23 12/26 12/30 1/2 1/6 1/8 1/13 1/15 1/20 1/22   Knee PROM                                                    Neuro Re-Ed         RE-EVAL    Quad Sets             Quad Set + SLR 3x10            TKE Standing EOB 5\"x20 (GTB)            SL Hip ABD             Prone Hip EXT             Step Up + Down (+eccentric focus) 3x5 (8in) 2x10 (8in) 2x10 (8in)          Clinic Walking [facility] 4 Laps 4 Laps           Lateral Step Down   2x10 (4in) 2x10 (6in) 2x10 (6in) 3x10 (8in) 3x10 (8in) 3x10 (8in)  3x10 (8in)   Mini Squats Bar Support Brace On 2x10 (to 90)  Offset STS 2x10 2x10 (BOSU Squat)   2x10 (BOSU Squat)      SLS Balance 3x30\" 3x30\" (+AIREX)  3x5 (+clock taps) 3x5 (+clock taps on airex) 3x30\" (+ Ball toss) 3x30\" (+ Ball toss) 3x30\" (+ Ball toss) (+blue foam disk) 3x30\" (+ Ball toss) (+blue foam disk)   3x5 (+clock taps " on airex)   Lateral Band Walk  4 Laps (GTB) 4 Laps (GTB) 4 Laps (GTB) 4 Laps (GTB) 4 Laps (BTB)       Monster Walk vs TB   4 Laps (GTB) 4 Laps (GTB) 4 Laps (GTB) 4 Laps (BTB)       Step Back Lunge (fwd/lateral)       2x10ea 2x10ea     Box Hops to Step          2x10 (8in)   Ther Ex             Rec Bike 6min 10min (lvl 3) 10min (lvl 4) 10min (lvl 5) 10min (lvl 6) 10min (lvl 6) 10min (lvl 7) 6min (lvl 10)  6min (lvl10)   Treadmill Gait Training     5min (3.0) 8min (3.5) 10min (3.5) 10min (4.0) 10min (4.0) 10min (4.0)   Heel Slides PROM + Strap             Prone Hang Stretch   5min 5min         Seated HS Stretch             Calf Stretch Supine             Heel Prop LLLD Stretch             Leg Press (B/L) 3x10 (#95) 3x10 (#115) 3x10 (#115) 3x10 (#115)         Leg Press (U/L)  2x10 (#75) 2x10 (#75) 2x10 (#75) 3x10 (#135) 3x10 (#135) 3x10 (#135) 3x10 (#135)  3x10 (#155)   Calf Raises U/L        2x10     Ther Activity                                       Gait Training                                       Modalities

## 2025-01-29 ENCOUNTER — APPOINTMENT (OUTPATIENT)
Dept: PHYSICAL THERAPY | Facility: REHABILITATION | Age: 35
End: 2025-01-29
Payer: COMMERCIAL

## 2025-03-04 ENCOUNTER — OFFICE VISIT (OUTPATIENT)
Dept: OBGYN CLINIC | Facility: MEDICAL CENTER | Age: 35
End: 2025-03-04

## 2025-03-04 VITALS — WEIGHT: 163 LBS | HEIGHT: 66 IN | BODY MASS INDEX: 26.2 KG/M2

## 2025-03-04 DIAGNOSIS — S83.281A TEAR OF LATERAL MENISCUS OF RIGHT KNEE, CURRENT, UNSPECIFIED TEAR TYPE, INITIAL ENCOUNTER: ICD-10-CM

## 2025-03-04 DIAGNOSIS — M76.31 IT BAND SYNDROME, RIGHT: ICD-10-CM

## 2025-03-04 DIAGNOSIS — Z98.890 S/P ARTHROSCOPIC PARTIAL MEDIAL MENISCECTOMY OF RIGHT KNEE: ICD-10-CM

## 2025-03-04 DIAGNOSIS — Z98.890 S/P ARTHROSCOPIC PARTIAL LATERAL MENISCECTOMY OF RIGHT KNEE: ICD-10-CM

## 2025-03-04 DIAGNOSIS — Z87.828 S/P ARTHROSCOPIC PARTIAL LATERAL MENISCECTOMY OF RIGHT KNEE: ICD-10-CM

## 2025-03-04 DIAGNOSIS — S83.211S: ICD-10-CM

## 2025-03-04 DIAGNOSIS — Z87.39 S/P ARTHROSCOPIC RECONSTRUCTION OF ACL OF RIGHT KNEE USING QUADRICEPS TENDON AUTOGRAFT: Primary | ICD-10-CM

## 2025-03-04 DIAGNOSIS — S83.511S RUPTURE OF ANTERIOR CRUCIATE LIGAMENT OF RIGHT KNEE, SEQUELA: ICD-10-CM

## 2025-03-04 DIAGNOSIS — Z87.828 S/P ARTHROSCOPIC PARTIAL MEDIAL MENISCECTOMY OF RIGHT KNEE: ICD-10-CM

## 2025-03-04 DIAGNOSIS — Z98.890 S/P ARTHROSCOPIC RECONSTRUCTION OF ACL OF RIGHT KNEE USING QUADRICEPS TENDON AUTOGRAFT: Primary | ICD-10-CM

## 2025-03-04 PROCEDURE — 99213 OFFICE O/P EST LOW 20 MIN: CPT | Performed by: ORTHOPAEDIC SURGERY

## 2025-03-04 NOTE — PROGRESS NOTES
Orthopedics Sports Medicine Knee Post Operative Visit    Name: Yasir Avalos      : 1990      MRN: [id number]  Encounter Provider: Gigi Chapa DO  Encounter Date: 3/4/2025   Encounter department: [facility]  :  Assessment & Plan  S/P arthroscopic reconstruction of ACL of right knee using quadriceps tendon autograft         S/P arthroscopic partial medial meniscectomy of right knee         S/P arthroscopic partial lateral meniscectomy of right knee         Rupture of anterior cruciate ligament of right knee, sequela         Bucket handle tear of medial meniscus of right knee, unspecified whether old or current tear, sequela         Tear of lateral meniscus of right knee, current, unspecified tear type, initial encounter         It band syndrome, right           Assesment:     34 y.o. male 4 months s/p surgical arthroscopy of the right ACL quadriceps autograft reconstruction knee with partial medial meniscectomy for multi-directional bucket-hand tear and lateral meniscectomy, DOS: 2024, with IT band tendonitis.    Plan:    Post-Operative treatment:    Ice to knee for 20 minutes at least 1-2 times daily.  Would like to see patient attending PT. Due to no insurance patient is currently not attending PT. Recent PT notes reviewed.  Continue strengthening exercises for the knee.  4-point hip strengthening exercise sheet provided.  Recommended over-the-counter topical Voltaren gel for pain as needed.  His lateral numbness will continue to improve with time and is secondary to the nerves cut or stretched at the time of surgery. The nerve regenerates with improvement in this numbness by 1 year post-operatively.  If no improvement with strengthening exercises for his pain and mechanical symptoms, can consider repeat MRI in the future.      Imaging:    No imaging was available for review today.    Weight bearing:  as tolerated     ROM:  Full    Brace:  No  brace needed    DVT Prophylaxis:  Ambulation    Follow up:   2 months    Patient was advised that if they have any fevers, chills, chest pain, shortness of breath, redness or drainage from the incision, please let our office know immediately.        History of Present Illness   HPI  Chief Complaint   Patient presents with    Right Knee - Follow-up       History of Present Illness:    The patient is a 34 y.o. male who is being evaluated post operatively 4 months s/p surgical arthroscopy of the right ACL quadriceps autograft reconstruction knee with partial medial and lateral meniscectomies, DOS: 11/08/2024.    Since the prior visit, He reports significant improvement.     Pain is well controlled.  Patient reports anterior and lateral knee pain and numbness when going to stand up, with keeping his knee straight with sleeping, and with sitting for 10-15 minutes and going to stand with stiffness and crunch. Pain today is 4/10. The patient is using ice to control swelling. Patient reports knee swelling that improves with exercises. Patient denies knee instability.    They have started physical therapy. Patient put PT on hold due to cost and was working with PT on financial assistance. Patient reports he is performing his HEP. He is not running, walking fast with posterior knee pain.     The patient Ambulation for DVT ppx.  The patient has been ambulating without crutches.    The patient has been ambulating without a brace.    The patient denies any fevers, chills, calf pain, chest pain/shortness of breath, redness or drainage from the incision.    I have reviewed the past medical, surgical, social and family history, medications and allergies as documented in the EMR.     hotline #[id number] and #[id number] (due to being disconnected) used for Japanese translation throughout today's visit.        Review of systems: ROS is negative other than that noted in the HPI.  Constitutional: Negative for fatigue and fever.  "    Objective   Ht 5' 6\" (1.676 m)   Wt 73.9 kg (163 lb)   BMI 26.31 kg/m²      Physical Exam:    Height 5' 6\" (1.676 m), weight 73.9 kg (163 lb).    General/Constitutional: NAD, well developed, well nourished  HENT: Normocephalic, atraumatic  CV: Intact distal pulses, regular rate  Resp: No respiratory distress or labored breathing  Lymphatic: No lymphadenopathy palpated  Neuro: Alert and Oriented x 3, no focal deficits  Psych: Normal mood, normal affect, normal judgement, normal behavior  Skin: Warm, dry, no rashes, no erythema      Knee Exam (focused):                  RIGHT LEFT   ROM:   0-130 0-130   Palpation: Effusion negative negative     MJL tenderness Negative Negative     LJL tenderness Positive LJL and lateral femoral condyle Negative   Instability: Varus Stable  stable     Valgus stable stable   Special Tests: Lachman Negative Negative     Posterior drawer Negative Negative     Anterior drawer Negative Negative     Pivot shift not tested not tested     Dial not tested not tested   Patella: Palpation no tenderness no tenderness     Mobility 1/4 1/4     Apprehension Negative Negative   Other: Single leg 1/4 squat not tested    IT band tightness on palpation not tested      Incisions show no erythema, no drainage    LE NV Exam: +2 DP/PT pulses bilaterally  Sensation intact to light touch L2-S1 bilaterally     Bilateral hip ROM demonstrates no pain actively or passively    No calf tenderness to palpation bilaterally    Scribe Attestation      I,:  Brittany Anne am acting as a scribe while in the presence of the attending physician.:       I,:  Gigi Chapa, DO personally performed the services described in this documentation    as scribed in my presence.:             "

## 2025-05-06 ENCOUNTER — OFFICE VISIT (OUTPATIENT)
Dept: OBGYN CLINIC | Facility: MEDICAL CENTER | Age: 35
End: 2025-05-06

## 2025-05-06 VITALS — BODY MASS INDEX: 28.28 KG/M2 | HEIGHT: 66 IN | WEIGHT: 176 LBS

## 2025-05-06 DIAGNOSIS — Z98.890 S/P ARTHROSCOPIC RECONSTRUCTION OF ACL OF RIGHT KNEE USING QUADRICEPS TENDON AUTOGRAFT: Primary | ICD-10-CM

## 2025-05-06 DIAGNOSIS — S83.211S: ICD-10-CM

## 2025-05-06 DIAGNOSIS — Z87.828 S/P ARTHROSCOPIC PARTIAL LATERAL MENISCECTOMY OF RIGHT KNEE: ICD-10-CM

## 2025-05-06 DIAGNOSIS — M76.31 IT BAND SYNDROME, RIGHT: ICD-10-CM

## 2025-05-06 DIAGNOSIS — Z98.890 S/P ARTHROSCOPIC PARTIAL LATERAL MENISCECTOMY OF RIGHT KNEE: ICD-10-CM

## 2025-05-06 DIAGNOSIS — Z87.828 S/P ARTHROSCOPIC PARTIAL MEDIAL MENISCECTOMY OF RIGHT KNEE: ICD-10-CM

## 2025-05-06 DIAGNOSIS — Z98.890 S/P ARTHROSCOPIC PARTIAL MEDIAL MENISCECTOMY OF RIGHT KNEE: ICD-10-CM

## 2025-05-06 DIAGNOSIS — Z87.39 S/P ARTHROSCOPIC RECONSTRUCTION OF ACL OF RIGHT KNEE USING QUADRICEPS TENDON AUTOGRAFT: Primary | ICD-10-CM

## 2025-05-06 DIAGNOSIS — S83.281A TEAR OF LATERAL MENISCUS OF RIGHT KNEE, CURRENT, UNSPECIFIED TEAR TYPE, INITIAL ENCOUNTER: ICD-10-CM

## 2025-05-06 DIAGNOSIS — S83.511S RUPTURE OF ANTERIOR CRUCIATE LIGAMENT OF RIGHT KNEE, SEQUELA: ICD-10-CM

## 2025-05-06 PROCEDURE — 99213 OFFICE O/P EST LOW 20 MIN: CPT | Performed by: ORTHOPAEDIC SURGERY

## 2025-05-06 NOTE — PROGRESS NOTES
Orthopedics Sports Medicine Knee Post Operative Visit    Name: Yasir Avalos      : 1990      MRN: [id number]  Encounter Provider: Gigi Chapa DO  Encounter Date: 2025   Encounter department: [facility]  :  Assessment & Plan  S/P arthroscopic reconstruction of ACL of right knee using quadriceps tendon autograft         S/P arthroscopic partial medial meniscectomy of right knee         S/P arthroscopic partial lateral meniscectomy of right knee         Rupture of anterior cruciate ligament of right knee, sequela         Bucket handle tear of medial meniscus of right knee, unspecified whether old or current tear, sequela         Tear of lateral meniscus of right knee, current, unspecified tear type, initial encounter         It band syndrome, right           Assesment:     34 y.o. male 6 months s/p surgical arthroscopy of the right ACL quadriceps autograft reconstruction knee with partial medial meniscectomy for multi-directional bucket-hand tear and lateral meniscectomy, DOS: 2024, with IT band tendonitis.    Plan:    Post-Operative treatment:    Continue home exercises    Follow up in 2 months for final visit and full clearance    History of Present Illness   HPI  Chief Complaint   Patient presents with    Right Knee - Follow-up       History of Present Illness:    The patient is a 34 y.o. male who is being evaluated post operatively 6 months s/p surgical arthroscopy of the right ACL quadriceps autograft reconstruction knee with partial medial and lateral meniscectomies, DOS: 2024.    Since the prior visit, He reports significant improvement.     Pain is well controlled. He has some occasional anterior and lateral pain.  No instability.  He is doing home exercises.  No new injury.  No other complaints.       hotline used for Austrian translation throughout today's visit.        Review of systems: ROS is negative other than  "that noted in the HPI.  Constitutional: Negative for fatigue and fever.     Objective   Ht 5' 6\" (1.676 m)   Wt 79.8 kg (176 lb)   BMI 28.41 kg/m²      Physical Exam:    Height 5' 6\" (1.676 m), weight 79.8 kg (176 lb).    General/Constitutional: NAD, well developed, well nourished  HENT: Normocephalic, atraumatic  CV: Intact distal pulses, regular rate  Resp: No respiratory distress or labored breathing  Lymphatic: No lymphadenopathy palpated  Neuro: Alert and Oriented x 3, no focal deficits  Psych: Normal mood, normal affect, normal judgement, normal behavior  Skin: Warm, dry, no rashes, no erythema      Knee Exam (focused):                  RIGHT LEFT   ROM:   0-130 0-130   Palpation: Effusion negative negative     MJL tenderness Negative Negative     LJL tenderness Positive LJL and lateral femoral condyle Negative   Instability: Varus Stable  stable     Valgus stable stable   Special Tests: Lachman Negative Negative     Posterior drawer Negative Negative     Anterior drawer Negative Negative     Pivot shift not tested not tested     Dial not tested not tested   Patella: Palpation no tenderness no tenderness     Mobility 1/4 1/4     Apprehension Negative Negative   Other: Single leg 1/4 squat not tested    IT band tightness on palpation not tested      Incisions show no erythema, no drainage    LE NV Exam: +2 DP/PT pulses bilaterally  Sensation intact to light touch L2-S1 bilaterally     Bilateral hip ROM demonstrates no pain actively or passively    No calf tenderness to palpation bilaterally    Scribe Attestation      I,:   am acting as a scribe while in the presence of the attending physician.:       I,:   personally performed the services described in this documentation    as scribed in my presence.:             "

## 2025-07-08 ENCOUNTER — OFFICE VISIT (OUTPATIENT)
Dept: OBGYN CLINIC | Facility: MEDICAL CENTER | Age: 35
End: 2025-07-08

## 2025-07-08 VITALS — WEIGHT: 171.4 LBS | HEIGHT: 66 IN | BODY MASS INDEX: 27.55 KG/M2

## 2025-07-08 DIAGNOSIS — Z98.890 S/P ARTHROSCOPIC PARTIAL MEDIAL MENISCECTOMY OF RIGHT KNEE: ICD-10-CM

## 2025-07-08 DIAGNOSIS — Z87.39 S/P ARTHROSCOPIC RECONSTRUCTION OF ACL OF RIGHT KNEE USING QUADRICEPS TENDON AUTOGRAFT: Primary | ICD-10-CM

## 2025-07-08 DIAGNOSIS — Z87.828 S/P ARTHROSCOPIC PARTIAL MEDIAL MENISCECTOMY OF RIGHT KNEE: ICD-10-CM

## 2025-07-08 DIAGNOSIS — M76.31 IT BAND SYNDROME, RIGHT: ICD-10-CM

## 2025-07-08 DIAGNOSIS — Z98.890 S/P ARTHROSCOPIC RECONSTRUCTION OF ACL OF RIGHT KNEE USING QUADRICEPS TENDON AUTOGRAFT: Primary | ICD-10-CM

## 2025-07-08 DIAGNOSIS — Z98.890 S/P ARTHROSCOPIC PARTIAL LATERAL MENISCECTOMY OF RIGHT KNEE: ICD-10-CM

## 2025-07-08 DIAGNOSIS — Z87.828 S/P ARTHROSCOPIC PARTIAL LATERAL MENISCECTOMY OF RIGHT KNEE: ICD-10-CM

## 2025-07-08 PROCEDURE — 99213 OFFICE O/P EST LOW 20 MIN: CPT | Performed by: ORTHOPAEDIC SURGERY

## 2025-07-08 NOTE — PROGRESS NOTES
Orthopedics Sports Medicine Knee Post Operative Visit    Name: Yasir Avalos      : 1990      MRN: [id number]  Encounter Provider: Gigi Chapa DO  Encounter Date: 2025   Encounter department: [facility]  :  Assessment & Plan  S/P arthroscopic reconstruction of ACL of right knee using quadriceps tendon autograft  S/P arthroscopic partial medial meniscectomy of right knee  S/P arthroscopic partial lateral meniscectomy of right knee  It band syndrome, right  Assesment:     34 y.o. male 8 months s/p surgical arthroscopy of the right ACL quadriceps autograft reconstruction knee with partial medial meniscectomy for multi-directional bucket-hand tear and partial lateral meniscectomy, DOS: 2024     Plan:    Post-Operative treatment:    If no improvement with lateral knee pain and pop, can order MRI to evaluate for new meniscus tear.  Continue HEP per protocol. Continue to keep knee and hip muscles strong over time.  Cleared back to all activity without restrictions.    Imaging:    All imaging from today was reviewed by myself and explained to the patient.     Weight bearing:  as tolerated     ROM:  Full    Brace:  No brace needed    DVT Prophylaxis:  Ambulation    Follow up:  3-4 months for final recheck after return to full activity    Patient was advised that if they have any fevers, chills, chest pain, shortness of breath, redness or drainage from the incision, please let our office know immediately.               History of Present Illness   HPI  Chief Complaint   Patient presents with    Right Knee - Follow-up     Pt returns for f/u of R knee post having ACL repair 24. Pt notes improvement. Denies pain today       History of Present Illness:    The patient is a 34 y.o. male who is being evaluated post operatively 8 months s/p surgical arthroscopy of the right ACL quadriceps autograft reconstruction knee with partial medial meniscectomy  "for multi-directional bucket-hand tear and partial lateral meniscectomy, DOS: 11/08/2024, with IT band syndrome.    Since the prior visit, He reports significant improvement.     Pain is well controlled. Has lateral knee discomfort when straightening his knee into extension with pop.  The patient is using ice to control swelling. Denies instability episodes.     They have completed physical therapy. Is running in straight lines without change of direction.     The patient Ambulation for DVT ppx.  The patient has been ambulating without crutches.    The patient has been ambulating without a brace.    The patient denies any fevers, chills, calf pain, chest pain/shortness of breath, redness or drainage from the incision.       I have reviewed the past medical, surgical, social and family history, medications and allergies as documented in the EMR.     hotline #[id number] used for Occitan translation during today's visit.      Review of systems: ROS is negative other than that noted in the HPI.  Constitutional: Negative for fatigue and fever.     Objective   Ht 5' 6\" (1.676 m)   Wt 77.7 kg (171 lb 6.4 oz)   BMI 27.66 kg/m²      Physical Exam:    Height 5' 6\" (1.676 m), weight 77.7 kg (171 lb 6.4 oz).    General/Constitutional: NAD, well developed, well nourished  HENT: Normocephalic, atraumatic  CV: Intact distal pulses, regular rate  Resp: No respiratory distress or labored breathing  Lymphatic: No lymphadenopathy palpated  Neuro: Alert and Oriented x 3, no focal deficits  Psych: Normal mood, normal affect, normal judgement, normal behavior  Skin: Warm, dry, no rashes, no erythema      Knee Exam (focused):                  RIGHT LEFT   ROM:   0-130 0-130   Palpation: Effusion negative negative     MJL tenderness Minimal  Negative     LJL tenderness Negative Negative   Instability: Varus stable stable     Valgus stable stable   Special Tests: Lachman Negative (1a) Negative     Posterior drawer Negative Negative "     Anterior drawer Negative Negative     Pivot shift not tested not tested     Dial not tested not tested   Patella: Palpation no tenderness no tenderness     Mobility 1/4 1/4     Apprehension Negative Negative   Other: Single leg 1/4 squat not tested    ITB tight upon palpation not tested      Incisions show no erythema, no drainage    LE NV Exam: +2 DP/PT pulses bilaterally  Sensation intact to light touch L2-S1 bilaterally     Bilateral hip ROM demonstrates no pain actively or passively    No calf tenderness to palpation bilaterally    Scribe Attestation      I,:  Brittany Anne am acting as a scribe while in the presence of the attending physician.:       I,:  Gigi Chapa DO personally performed the services described in this documentation    as scribed in my presence.:

## (undated) DEVICE — ABDOMINAL PAD: Brand: DERMACEA

## (undated) DEVICE — CUTTER FLIPCUTTER III 6-12MM

## (undated) DEVICE — MAT ABSORBANT ARTHROSCOPY FLOOR 46 X 40 IN

## (undated) DEVICE — SUT MONOCRYL 4-0 PS-2 27 IN Y426H

## (undated) DEVICE — SUT VICRYL 0 CT-1 36 IN J946H

## (undated) DEVICE — BLADE SHAVER DISSECTOR 5MM 13CM COOLCUT

## (undated) DEVICE — 3M™ STERI-DRAPE™ U-DRAPE 1015: Brand: STERI-DRAPE™

## (undated) DEVICE — DRAPE SHEET THREE QUARTER

## (undated) DEVICE — GLOVE INDICATOR PI UNDERGLOVE SZ 8.5 BLUE

## (undated) DEVICE — BLADE SHAVER DISSECTOR  4MM 13CM CRV COOLCUT

## (undated) DEVICE — SYRINGE 30ML LL

## (undated) DEVICE — INTENDED FOR TISSUE SEPARATION, AND OTHER PROCEDURES THAT REQUIRE A SHARP SURGICAL BLADE TO PUNCTURE OR CUT.: Brand: BARD-PARKER ® CARBON RIB-BACK BLADES

## (undated) DEVICE — TUBING ARTHROSCOPIC WAVE  MAIN PUMP

## (undated) DEVICE — EXTREMITY DRAPE W/ARMBOARD COVERS: Brand: CONVERTORS

## (undated) DEVICE — GAUZE SPONGES,16 PLY: Brand: CURITY

## (undated) DEVICE — SUT VICRYL 2-0 SH 27 IN UNDYED J417H

## (undated) DEVICE — PROBE ABLATION  APOLLO RF 90 DEG MULTI PORT

## (undated) DEVICE — GLOVE INDICATOR PI UNDERGLOVE SZ 7 BLUE

## (undated) DEVICE — PUDDLE VAC

## (undated) DEVICE — SUT TIGERSTICK TIGERWIRE 50IN WHITE/BLACK AR-7209T

## (undated) DEVICE — HARVESTER QUADPRO 9MM

## (undated) DEVICE — GLOVE SRG BIOGEL 6.5

## (undated) DEVICE — SUTURETAPE FIBERLOOP W/NDL WHITE/BL AR-7534

## (undated) DEVICE — EXOFIN PRECISION PEN HIGH VISCOSITY TOPICAL SKIN ADHESIVE: Brand: EXOFIN PRECISION PEN, 1G

## (undated) DEVICE — SUT FIBERSTICK #2 50IN BLUE AR-7209

## (undated) DEVICE — STIRRUP STRAP ADULT DISP

## (undated) DEVICE — DISPOSABLE OR TOWEL: Brand: CARDINAL HEALTH

## (undated) DEVICE — BLADE SHAVER DISSECTOR 4MM 13CM COOLCUT

## (undated) DEVICE — BRUSH EZ SCRUB PCMX W/NAIL CLEANER

## (undated) DEVICE — PLUMEPEN PRO 10FT

## (undated) DEVICE — SURGICAL GOWN, XL SMARTSLEEVE: Brand: CONVERTORS

## (undated) DEVICE — BRACE KNEE POST OP UNIVERSAL X-ROM

## (undated) DEVICE — IMPERVIOUS STOCKINETTE: Brand: DEROYAL

## (undated) DEVICE — BETHLEHEM UNIVERSAL  ARTHRO PK: Brand: CARDINAL HEALTH

## (undated) DEVICE — CANNULA BUTTON 10 X 30MM PASSPORT

## (undated) DEVICE — CUFF TOURNIQUET 30 X 4 IN QUICK CONNECT DISP 1BLA

## (undated) DEVICE — 3M™ STERI-STRIP™ REINFORCED ADHESIVE SKIN CLOSURES, R1547, 1/2 IN X 4 IN (12 MM X 100 MM), 6 STRIPS/ENVELOPE: Brand: 3M™ STERI-STRIP™

## (undated) DEVICE — BULB SYRINGE,IRRIGATION WITH PROTECTIVE CAP: Brand: DOVER

## (undated) DEVICE — GLOVE SRG BIOGEL 8.5

## (undated) DEVICE — 3M™ IOBAN™ 2 ANTIMICROBIAL INCISE DRAPE 6650EZ: Brand: IOBAN™ 2

## (undated) DEVICE — ACE WRAP 6 IN UNSTERILE